# Patient Record
Sex: FEMALE | Race: WHITE | NOT HISPANIC OR LATINO | Employment: FULL TIME | URBAN - METROPOLITAN AREA
[De-identification: names, ages, dates, MRNs, and addresses within clinical notes are randomized per-mention and may not be internally consistent; named-entity substitution may affect disease eponyms.]

---

## 2017-01-06 DIAGNOSIS — N95.1 MENOPAUSAL STATE: ICD-10-CM

## 2017-01-06 DIAGNOSIS — F52.9 FEMALE SEXUAL DYSFUNCTION: ICD-10-CM

## 2017-01-09 RX ORDER — CONJUGATED ESTROGENS AND MEDROXYPROGESTERONE ACETATE .3; 1.5 MG/1; MG/1
TABLET, SUGAR COATED ORAL
Qty: 28 TABLET | Refills: 0 | OUTPATIENT
Start: 2017-01-09

## 2017-10-23 ENCOUNTER — HOSPITAL ENCOUNTER (OUTPATIENT)
Dept: PREADMISSION TESTING | Facility: HOSPITAL | Age: 57
Discharge: HOME | End: 2017-10-23
Attending: SURGERY
Payer: COMMERCIAL

## 2017-10-23 VITALS
SYSTOLIC BLOOD PRESSURE: 131 MMHG | OXYGEN SATURATION: 98 % | HEIGHT: 64 IN | RESPIRATION RATE: 16 BRPM | BODY MASS INDEX: 32.67 KG/M2 | TEMPERATURE: 98 F | DIASTOLIC BLOOD PRESSURE: 75 MMHG | WEIGHT: 191.38 LBS | HEART RATE: 75 BPM

## 2017-10-23 DIAGNOSIS — K80.20 GALLSTONES: ICD-10-CM

## 2017-10-23 LAB
ALBUMIN SERPL BCP-MCNC: 3.5 G/DL
ALP SERPL-CCNC: 115 U/L
ALT SERPL W/O P-5'-P-CCNC: 11 U/L
ANION GAP SERPL CALC-SCNC: 10 MMOL/L
AST SERPL-CCNC: 13 U/L
BASOPHILS # BLD AUTO: 0.02 K/UL
BASOPHILS NFR BLD: 0.2 %
BILIRUB SERPL-MCNC: 0.2 MG/DL
BUN SERPL-MCNC: 15 MG/DL
CALCIUM SERPL-MCNC: 9.5 MG/DL
CHLORIDE SERPL-SCNC: 102 MMOL/L
CO2 SERPL-SCNC: 26 MMOL/L
CREAT SERPL-MCNC: 1.1 MG/DL
DIFFERENTIAL METHOD: ABNORMAL
EOSINOPHIL # BLD AUTO: 0.1 K/UL
EOSINOPHIL NFR BLD: 1.4 %
ERYTHROCYTE [DISTWIDTH] IN BLOOD BY AUTOMATED COUNT: 13.4 %
EST. GFR  (AFRICAN AMERICAN): >60 ML/MIN/1.73 M^2
EST. GFR  (NON AFRICAN AMERICAN): 57 ML/MIN/1.73 M^2
GLUCOSE SERPL-MCNC: 93 MG/DL
HCT VFR BLD AUTO: 36.4 %
HGB BLD-MCNC: 12.7 G/DL
LYMPHOCYTES # BLD AUTO: 2.4 K/UL
LYMPHOCYTES NFR BLD: 24.8 %
MCH RBC QN AUTO: 29.2 PG
MCHC RBC AUTO-ENTMCNC: 34.9 G/DL
MCV RBC AUTO: 84 FL
MONOCYTES # BLD AUTO: 0.4 K/UL
MONOCYTES NFR BLD: 4.2 %
NEUTROPHILS # BLD AUTO: 6.6 K/UL
NEUTROPHILS NFR BLD: 69.4 %
PLATELET # BLD AUTO: 310 K/UL
PMV BLD AUTO: 8.9 FL
POTASSIUM SERPL-SCNC: 3.7 MMOL/L
PROT SERPL-MCNC: 7.7 G/DL
RBC # BLD AUTO: 4.35 M/UL
SODIUM SERPL-SCNC: 138 MMOL/L
WBC # BLD AUTO: 9.47 K/UL

## 2017-10-23 PROCEDURE — 80053 COMPREHEN METABOLIC PANEL: CPT

## 2017-10-23 PROCEDURE — 36415 COLL VENOUS BLD VENIPUNCTURE: CPT

## 2017-10-23 PROCEDURE — 85025 COMPLETE CBC W/AUTO DIFF WBC: CPT

## 2017-10-23 RX ORDER — IBUPROFEN 200 MG
400 TABLET ORAL EVERY 6 HOURS PRN
COMMUNITY
End: 2022-01-24

## 2017-10-23 RX ORDER — ERGOCALCIFEROL 1.25 MG/1
50000 CAPSULE ORAL
COMMUNITY

## 2017-10-23 RX ORDER — DILTIAZEM HYDROCHLORIDE 300 MG/1
300 CAPSULE, COATED, EXTENDED RELEASE ORAL DAILY
COMMUNITY

## 2017-10-23 RX ORDER — CHOLECALCIFEROL (VITAMIN D3) 25 MCG
1000 TABLET ORAL DAILY
COMMUNITY
End: 2020-08-21

## 2017-10-23 NOTE — DISCHARGE INSTRUCTIONS
Your surgery is scheduled for__FRIDAY  November  3,  2017_______________.    Call [phone] between 2 pm and 5 pm _THURSADAY November 2, 2017_______ to find out your arrival time for the day of surgery.      Report to SAME DAY SURGERY UNIT at _______AM   on the 2nd floor of the hospital.  Use the front entrance of the hospital before 6 am.                                              Important instructions:   Do not eat or drink after 12 midnight, including water.  It is okay to brush your teeth.  Do not have gum, candy or mints.    TAKE  CELEXA,  CARTIA,  AND  OMEPRAZOLE  WITH A SIP OF WATER THE AM OF SURGERY                 Prep instructions:    SHOWER   OTHER_____________      Please shower the night before and the morning of your surgery.       Use Hibiclens soap to your surgery site if instructed by your pre op nurse.   If your surgery is on your abdomen, be sure to wash your naval( BELLY BUTTON).    Be sure to rinse off Hibiclens after it is on your skin for several minutes.  Do not use Hibiclens on your face or genitals.     Do not wear make- up, including mascara.     You may wear deodorant only.     Do not wear powder, body lotion or cologne.     If you are going home on the same day of surgery, you must have arrangements for a ride home.  You will not be able to drive home if you were given anesthesia or sedation.       Stop taking Aspirin, Ibuprofen, Motrin and Aleve at least 3-5 days before your surgery. May take Tylenol / Acetaminophen  If needed     Wear loose fitting clothes allowing for bandages.     Please leave money and valuables home.        You may bring your cell phone.     Call the doctor if fever or illness should occur before your surgery.    Call [phone] to contact us here at [facility] if needed.

## 2017-11-02 ENCOUNTER — ANESTHESIA EVENT (OUTPATIENT)
Dept: SURGERY | Facility: HOSPITAL | Age: 57
End: 2017-11-02
Payer: COMMERCIAL

## 2017-11-03 ENCOUNTER — ANESTHESIA (OUTPATIENT)
Dept: SURGERY | Facility: HOSPITAL | Age: 57
End: 2017-11-03
Payer: COMMERCIAL

## 2017-11-03 ENCOUNTER — HOSPITAL ENCOUNTER (OUTPATIENT)
Facility: HOSPITAL | Age: 57
Discharge: HOME | End: 2017-11-03
Attending: SURGERY | Admitting: SURGERY
Payer: COMMERCIAL

## 2017-11-03 ENCOUNTER — SURGERY (OUTPATIENT)
Age: 57
End: 2017-11-03

## 2017-11-03 VITALS
DIASTOLIC BLOOD PRESSURE: 69 MMHG | WEIGHT: 191.38 LBS | SYSTOLIC BLOOD PRESSURE: 120 MMHG | OXYGEN SATURATION: 94 % | RESPIRATION RATE: 18 BRPM | BODY MASS INDEX: 32.67 KG/M2 | TEMPERATURE: 98 F | HEART RATE: 76 BPM | HEIGHT: 64 IN

## 2017-11-03 DIAGNOSIS — K80.20 GALLSTONES: Primary | ICD-10-CM

## 2017-11-03 PROCEDURE — 36000709 HC OR TIME LEV III EA ADD 15 MIN: Performed by: SURGERY

## 2017-11-03 PROCEDURE — 37000009 HC ANESTHESIA EA ADD 15 MINS: Performed by: SURGERY

## 2017-11-03 PROCEDURE — 36000708 HC OR TIME LEV III 1ST 15 MIN: Performed by: SURGERY

## 2017-11-03 PROCEDURE — 88304 TISSUE EXAM BY PATHOLOGIST: CPT | Performed by: PATHOLOGY

## 2017-11-03 PROCEDURE — 63600175 PHARM REV CODE 636 W HCPCS: Performed by: SURGERY

## 2017-11-03 PROCEDURE — 27201423 OPTIME MED/SURG SUP & DEVICES STERILE SUPPLY: Performed by: SURGERY

## 2017-11-03 PROCEDURE — C9290 INJ, BUPIVACAINE LIPOSOME: HCPCS | Performed by: SURGERY

## 2017-11-03 PROCEDURE — 71000016 HC POSTOP RECOV ADDL HR: Performed by: SURGERY

## 2017-11-03 PROCEDURE — 71000033 HC RECOVERY, INTIAL HOUR: Performed by: SURGERY

## 2017-11-03 PROCEDURE — D9220A PRA ANESTHESIA: Mod: CRNA,,, | Performed by: NURSE ANESTHETIST, CERTIFIED REGISTERED

## 2017-11-03 PROCEDURE — 25000003 PHARM REV CODE 250: Performed by: SURGERY

## 2017-11-03 PROCEDURE — 71000015 HC POSTOP RECOV 1ST HR: Performed by: SURGERY

## 2017-11-03 PROCEDURE — 25000003 PHARM REV CODE 250: Performed by: ANESTHESIOLOGY

## 2017-11-03 PROCEDURE — 37000008 HC ANESTHESIA 1ST 15 MINUTES: Performed by: SURGERY

## 2017-11-03 PROCEDURE — 63600175 PHARM REV CODE 636 W HCPCS: Performed by: ANESTHESIOLOGY

## 2017-11-03 PROCEDURE — 25000003 PHARM REV CODE 250: Performed by: NURSE ANESTHETIST, CERTIFIED REGISTERED

## 2017-11-03 PROCEDURE — 63600175 PHARM REV CODE 636 W HCPCS: Performed by: NURSE ANESTHETIST, CERTIFIED REGISTERED

## 2017-11-03 PROCEDURE — 71000039 HC RECOVERY, EACH ADD'L HOUR: Performed by: SURGERY

## 2017-11-03 PROCEDURE — 88304 TISSUE EXAM BY PATHOLOGIST: CPT | Mod: 26,,, | Performed by: PATHOLOGY

## 2017-11-03 PROCEDURE — D9220A PRA ANESTHESIA: Mod: ANES,,, | Performed by: ANESTHESIOLOGY

## 2017-11-03 RX ORDER — LIDOCAINE HCL/PF 100 MG/5ML
SYRINGE (ML) INTRAVENOUS
Status: DISCONTINUED | OUTPATIENT
Start: 2017-11-03 | End: 2017-11-03

## 2017-11-03 RX ORDER — HYDROMORPHONE HYDROCHLORIDE 2 MG/ML
0.2 INJECTION, SOLUTION INTRAMUSCULAR; INTRAVENOUS; SUBCUTANEOUS EVERY 5 MIN PRN
Status: DISCONTINUED | OUTPATIENT
Start: 2017-11-03 | End: 2017-11-03 | Stop reason: HOSPADM

## 2017-11-03 RX ORDER — CEFAZOLIN SODIUM 2 G/50ML
2 SOLUTION INTRAVENOUS
Status: COMPLETED | OUTPATIENT
Start: 2017-11-03 | End: 2017-11-03

## 2017-11-03 RX ORDER — PROPOFOL 10 MG/ML
VIAL (ML) INTRAVENOUS
Status: DISCONTINUED | OUTPATIENT
Start: 2017-11-03 | End: 2017-11-03

## 2017-11-03 RX ORDER — SODIUM CHLORIDE, SODIUM LACTATE, POTASSIUM CHLORIDE, CALCIUM CHLORIDE 600; 310; 30; 20 MG/100ML; MG/100ML; MG/100ML; MG/100ML
INJECTION, SOLUTION INTRAVENOUS CONTINUOUS
Status: DISCONTINUED | OUTPATIENT
Start: 2017-11-03 | End: 2017-11-03 | Stop reason: HOSPADM

## 2017-11-03 RX ORDER — SODIUM CHLORIDE 0.9 % (FLUSH) 0.9 %
3 SYRINGE (ML) INJECTION
Status: DISCONTINUED | OUTPATIENT
Start: 2017-11-03 | End: 2017-11-03 | Stop reason: HOSPADM

## 2017-11-03 RX ORDER — SUCCINYLCHOLINE CHLORIDE 20 MG/ML
INJECTION INTRAMUSCULAR; INTRAVENOUS
Status: DISCONTINUED | OUTPATIENT
Start: 2017-11-03 | End: 2017-11-03

## 2017-11-03 RX ORDER — GLYCOPYRROLATE 0.2 MG/ML
INJECTION INTRAMUSCULAR; INTRAVENOUS
Status: DISCONTINUED | OUTPATIENT
Start: 2017-11-03 | End: 2017-11-03

## 2017-11-03 RX ORDER — HYDROCODONE BITARTRATE AND ACETAMINOPHEN 5; 325 MG/1; MG/1
1 TABLET ORAL EVERY 4 HOURS PRN
Qty: 30 TABLET | Refills: 0 | Status: SHIPPED | OUTPATIENT
Start: 2017-11-03 | End: 2017-11-13

## 2017-11-03 RX ORDER — ONDANSETRON 2 MG/ML
INJECTION INTRAMUSCULAR; INTRAVENOUS
Status: DISCONTINUED | OUTPATIENT
Start: 2017-11-03 | End: 2017-11-03

## 2017-11-03 RX ORDER — HYDROCODONE BITARTRATE AND ACETAMINOPHEN 5; 325 MG/1; MG/1
1 TABLET ORAL EVERY 4 HOURS PRN
Status: DISCONTINUED | OUTPATIENT
Start: 2017-11-03 | End: 2017-11-03 | Stop reason: HOSPADM

## 2017-11-03 RX ORDER — SODIUM CHLORIDE 9 MG/ML
INJECTION, SOLUTION INTRAVENOUS CONTINUOUS
Status: DISCONTINUED | OUTPATIENT
Start: 2017-11-03 | End: 2017-11-03 | Stop reason: HOSPADM

## 2017-11-03 RX ORDER — MIDAZOLAM HYDROCHLORIDE 1 MG/ML
INJECTION, SOLUTION INTRAMUSCULAR; INTRAVENOUS
Status: DISCONTINUED | OUTPATIENT
Start: 2017-11-03 | End: 2017-11-03

## 2017-11-03 RX ORDER — FENTANYL CITRATE 50 UG/ML
INJECTION, SOLUTION INTRAMUSCULAR; INTRAVENOUS
Status: DISCONTINUED | OUTPATIENT
Start: 2017-11-03 | End: 2017-11-03

## 2017-11-03 RX ORDER — PHENYLEPHRINE HYDROCHLORIDE 10 MG/ML
INJECTION INTRAVENOUS
Status: DISCONTINUED | OUTPATIENT
Start: 2017-11-03 | End: 2017-11-03

## 2017-11-03 RX ORDER — METOCLOPRAMIDE HYDROCHLORIDE 5 MG/ML
INJECTION INTRAMUSCULAR; INTRAVENOUS
Status: DISCONTINUED | OUTPATIENT
Start: 2017-11-03 | End: 2017-11-03

## 2017-11-03 RX ORDER — NEOSTIGMINE METHYLSULFATE 1 MG/ML
INJECTION, SOLUTION INTRAVENOUS
Status: DISCONTINUED | OUTPATIENT
Start: 2017-11-03 | End: 2017-11-03

## 2017-11-03 RX ORDER — LIDOCAINE HYDROCHLORIDE 10 MG/ML
1 INJECTION, SOLUTION EPIDURAL; INFILTRATION; INTRACAUDAL; PERINEURAL ONCE
Status: DISCONTINUED | OUTPATIENT
Start: 2017-11-03 | End: 2017-11-03 | Stop reason: HOSPADM

## 2017-11-03 RX ORDER — ROCURONIUM BROMIDE 10 MG/ML
INJECTION, SOLUTION INTRAVENOUS
Status: DISCONTINUED | OUTPATIENT
Start: 2017-11-03 | End: 2017-11-03

## 2017-11-03 RX ORDER — BUPIVACAINE HYDROCHLORIDE 2.5 MG/ML
INJECTION, SOLUTION EPIDURAL; INFILTRATION; INTRACAUDAL
Status: DISCONTINUED | OUTPATIENT
Start: 2017-11-03 | End: 2017-11-03 | Stop reason: HOSPADM

## 2017-11-03 RX ADMIN — GLYCOPYRROLATE 0.6 MG: 0.2 INJECTION, SOLUTION INTRAMUSCULAR; INTRAVENOUS at 10:11

## 2017-11-03 RX ADMIN — ROCURONIUM BROMIDE 15 MG: 10 INJECTION, SOLUTION INTRAVENOUS at 09:11

## 2017-11-03 RX ADMIN — MIDAZOLAM HYDROCHLORIDE 2 MG: 1 INJECTION, SOLUTION INTRAMUSCULAR; INTRAVENOUS at 09:11

## 2017-11-03 RX ADMIN — HYDROMORPHONE HYDROCHLORIDE 0.2 MG: 2 INJECTION INTRAMUSCULAR; INTRAVENOUS; SUBCUTANEOUS at 11:11

## 2017-11-03 RX ADMIN — CEFAZOLIN SODIUM 2 G: 2 SOLUTION INTRAVENOUS at 09:11

## 2017-11-03 RX ADMIN — BUPIVACAINE 20 ML: 13.3 INJECTION, SUSPENSION, LIPOSOMAL INFILTRATION at 10:11

## 2017-11-03 RX ADMIN — METOCLOPRAMIDE 10 MG: 5 INJECTION, SOLUTION INTRAMUSCULAR; INTRAVENOUS at 09:11

## 2017-11-03 RX ADMIN — SODIUM CHLORIDE, SODIUM LACTATE, POTASSIUM CHLORIDE, AND CALCIUM CHLORIDE: .6; .31; .03; .02 INJECTION, SOLUTION INTRAVENOUS at 07:11

## 2017-11-03 RX ADMIN — HYDROCODONE BITARTRATE AND ACETAMINOPHEN 1 TABLET: 5; 325 TABLET ORAL at 01:11

## 2017-11-03 RX ADMIN — LIDOCAINE HYDROCHLORIDE 100 MG: 20 INJECTION, SOLUTION INTRAVENOUS at 09:11

## 2017-11-03 RX ADMIN — NEOSTIGMINE METHYLSULFATE 5 MG: 1 INJECTION INTRAVENOUS at 10:11

## 2017-11-03 RX ADMIN — SUCCINYLCHOLINE CHLORIDE 120 MG: 20 INJECTION, SOLUTION INTRAMUSCULAR; INTRAVENOUS at 09:11

## 2017-11-03 RX ADMIN — GLYCOPYRROLATE 0.2 MG: 0.2 INJECTION, SOLUTION INTRAMUSCULAR; INTRAVENOUS at 09:11

## 2017-11-03 RX ADMIN — BUPIVACAINE HYDROCHLORIDE 20 ML: 2.5 INJECTION, SOLUTION EPIDURAL; INFILTRATION; INTRACAUDAL; PERINEURAL at 10:11

## 2017-11-03 RX ADMIN — PROPOFOL 200 MG: 10 INJECTION, EMULSION INTRAVENOUS at 09:11

## 2017-11-03 RX ADMIN — ROCURONIUM BROMIDE 5 MG: 10 INJECTION, SOLUTION INTRAVENOUS at 09:11

## 2017-11-03 RX ADMIN — PHENYLEPHRINE HYDROCHLORIDE 200 MCG: 10 INJECTION INTRAVENOUS at 10:11

## 2017-11-03 RX ADMIN — ONDANSETRON 4 MG: 2 INJECTION, SOLUTION INTRAMUSCULAR; INTRAVENOUS at 09:11

## 2017-11-03 RX ADMIN — FENTANYL CITRATE 100 MCG: 50 INJECTION INTRAMUSCULAR; INTRAVENOUS at 09:11

## 2017-11-03 NOTE — BRIEF OP NOTE
[facility]  Brief Operative Note     SUMMARY     Surgery Date: 11/3/2017     Surgeon(s) and Role:     * Mango Dc MD - Primary    Assisting Surgeon: None    Pre-op Diagnosis:  Calculus of gallbladder without cholecystitis without obstruction [K80.20]    Post-op Diagnosis:  Post-Op Diagnosis Codes:     * Calculus of gallbladder without cholecystitis without obstruction [K80.20]    Procedure(s) (LRB):  CHOLECYSTECTOMY-LAPAROSCOPIC (N/A)    Anesthesia: General    Description of the findings of the procedure: gallstones    Findings/Key Components: same    Estimated Blood Loss: minimal         Specimens:   Specimen (12h ago through future)    Start     Ordered    11/03/17 0958  Specimen to Pathology - Surgery  Once     Comments:  Gallbladder      11/03/17 0958          Discharge Note    SUMMARY     Admit Date: 11/3/2017    Discharge Date and Time:  11/03/2017 10:21 AM    Hospital Course (synopsis of major diagnoses, care, treatment, and services provided during the course of the hospital stay): uneventful post op course     Final Diagnosis: Post-Op Diagnosis Codes:     * Calculus of gallbladder without cholecystitis without obstruction [K80.20]    Disposition: Home or Self Care    Follow Up/Patient Instructions:     Medications:  Reconciled Home Medications:   Current Discharge Medication List      START taking these medications    Details   hydrocodone-acetaminophen 5-325mg (NORCO) 5-325 mg per tablet Take 1 tablet by mouth every 4 (four) hours as needed for Pain.  Qty: 30 tablet, Refills: 0         CONTINUE these medications which have NOT CHANGED    Details   citalopram (CELEXA) 40 MG tablet Take 40 mg by mouth once daily.       diltiaZEM (CARDIZEM CD) 300 MG 24 hr capsule Take 300 mg by mouth once daily.      ergocalciferol (VITAMIN D2) 50,000 unit Cap Take 50,000 Units by mouth every 7 days.      dicyclomine (BENTYL) 20 mg tablet Take 20 mg by mouth every 6 (six) hours as needed.        ibuprofen (ADVIL,MOTRIN) 200 MG tablet Take 400 mg by mouth every 6 (six) hours as needed for Pain.      omeprazole (PRILOSEC) 20 MG capsule Take 2 capsules (40 mg total) by mouth once daily.  Qty: 30 capsule, Refills: 0      vitamin D 1000 units Tab Take 1,000 Units by mouth once daily.             Discharge Procedure Orders  Diet general       Follow-up Information     Mango Dc MD In 1 week.    Specialty:  General Surgery  Contact information:  [address] [place] [address]  [id number]  [place] [address] [phone]

## 2017-11-03 NOTE — TRANSFER OF CARE
"Anesthesia Transfer of Care Note    Patient: Dior Godwin    Procedure(s) Performed: Procedure(s) (LRB):  CHOLECYSTECTOMY-LAPAROSCOPIC (N/A)    Patient location: PACU    Anesthesia Type: general    Transport from OR: Transported from OR on room air with adequate spontaneous ventilation    Post pain: adequate analgesia    Post assessment: no apparent anesthetic complications    Post vital signs: stable    Level of consciousness: awake and sedated    Nausea/Vomiting: no nausea/vomiting    Complications: none    Transfer of care protocol was followed      Last vitals:   Visit Vitals  /85 (BP Location: Right arm, Patient Position: Lying)   Pulse 75   Temp 36.9 °C (98.4 °F) (Oral)   Resp 17   Ht 5' 4" (1.626 m)   Wt 86.8 kg (191 lb 6 oz)   SpO2 95%   Breastfeeding? No   BMI 32.85 kg/m²     "

## 2017-11-03 NOTE — OP NOTE
Laparoscopic cholecystectomy      DATE OF PROCEDURE: 11/03/2017       PREOPERATIVE DIAGNOSIS: Symptomatic cholelithiasis.     POSTOPERATIVE DIAGNOSIS: Symptomatic cholelithiasis.     PROCEDURE PERFORMED: Laparoscopic cholecystectomy.     SURGEON: Mango Dc M.D.     ANESTHESIA: General.     ESTIMATED BLOOD LOSS: minimal     DESCRIPTION OF OPERATION: The patient was brought to the Operating Room, placed  on operating room table in supine position. Under adequate anesthesia, prepped  and draped around her abdomen in the usual sterile fashion. Incision was made   in umbilicus through which a 5-mm port was inserted under direct vision. The   patient had her abdomen insufflated with 3.5 liters of CO2. Two other ports   were placed. An epigastric 11 mm and off right subcostal 5 mm port were placed.  The gallbladder was identified and retracted in cephalad fashion. Dissection   was done around the triangle of Calot. The cystic duct and cystic artery were   both identified and divided. The cystic duct was clipped. The gallbladder was   removed from the gallbladder fossa in antegrade fashion and brought out through   the epigastric port site. The abdomen was desufflated. The ports were removed   and port sites closed in layers with absorbable suture. Steri-Strips were   applied as well as bandage. The patient was awakened and transported to the   Recovery Room in satisfactory condition.

## 2017-11-03 NOTE — ANESTHESIA POSTPROCEDURE EVALUATION
"Anesthesia Post Evaluation    Patient: Dior Godwin    Procedure(s) Performed: Procedure(s) (LRB):  CHOLECYSTECTOMY-LAPAROSCOPIC (N/A)    Final Anesthesia Type: general  Patient location during evaluation: PACU  Patient participation: Yes- Able to Participate  Level of consciousness: awake and alert, oriented and awake  Post-procedure vital signs: reviewed and stable  Airway patency: patent  PONV status at discharge: No PONV  Anesthetic complications: no      Cardiovascular status: blood pressure returned to baseline  Respiratory status: unassisted, spontaneous ventilation and room air  Hydration status: euvolemic  Follow-up not needed.        Visit Vitals  /66   Pulse 72   Temp 36.9 °C (98.4 °F) (Oral)   Resp 18   Ht 5' 4" (1.626 m)   Wt 86.8 kg (191 lb 6 oz)   SpO2 96%   Breastfeeding? No   BMI 32.85 kg/m²       Pain/Richy Score: Pain Assessment Performed: Yes (11/3/2017 10:40 AM)  Presence of Pain: complains of pain/discomfort (11/3/2017 11:14 AM)  Pain Rating Prior to Med Admin: 4 (11/3/2017 11:26 AM)  Richy Score: 8 (11/3/2017 11:14 AM)      "

## 2017-11-03 NOTE — ANESTHESIA PREPROCEDURE EVALUATION
11/03/2017  Dior Godwin is a 57 y.o., female.    Anesthesia Evaluation    I have reviewed the Patient Summary Reports.    I have reviewed the Nursing Notes.   I have reviewed the Medications.     Review of Systems  Anesthesia Hx:  No problems with previous Anesthesia Denies Hx of Anesthetic complications  Neg history of prior surgery. Denies Family Hx of Anesthesia complications.   Denies Personal Hx of Anesthesia complications.   Social:  Non-Smoker, No Alcohol Use    Hematology/Oncology:  Hematology Normal   Oncology Normal     EENT/Dental:EENT/Dental Normal   Cardiovascular:   Exercise tolerance: good Hypertension    Pulmonary:  Pulmonary Normal    Renal/:  Renal/ Normal     Hepatic/GI:  Hepatic/GI Normal    Musculoskeletal:  Musculoskeletal Normal    Neurological:  Neurology Normal    Endocrine:  Endocrine Normal    Dermatological:  Skin Normal    Psych:  Psychiatric Normal           Physical Exam  General:  Well nourished    Airway/Jaw/Neck:  Airway Findings: Mouth Opening: Normal General Airway Assessment: Adult  Mallampati: II  TM Distance: Normal, at least 6 cm         Dental:  DENTAL FINDINGS: Normal   Chest/Lungs:  Chest/Lungs Clear    Heart/Vascular:  Heart Findings: Normal Heart murmur: negative       Mental Status:  Mental Status Findings:  Cooperative, Alert and Oriented         Anesthesia Plan  Type of Anesthesia, risks & benefits discussed:  Anesthesia Type:  general  Patient's Preference:   Intra-op Monitoring Plan:   Intra-op Monitoring Plan Comments:   Post Op Pain Control Plan:   Post Op Pain Control Plan Comments:   Induction:   IV  Beta Blocker:  Patient is not currently on a Beta-Blocker (No further documentation required).       Informed Consent: Patient understands risks and agrees with Anesthesia plan.  Questions answered. Anesthesia consent signed with patient.  ASA Score:  2     Day of Surgery Review of History & Physical:            Ready For Surgery From Anesthesia Perspective.

## 2017-11-03 NOTE — H&P (VIEW-ONLY)
Subjective:       Patient ID: Dior Godwin is a 57 y.o. female.    Chief Complaint: Cholelithiasis    HPI 58 yo female with symptomatic cholelithiasis  Review of Systems   Cardiovascular: Positive for chest pain.   Gastrointestinal: Positive for abdominal pain and nausea.       Objective:      Physical Exam   Constitutional: She is oriented to person, place, and time. She appears well-developed and well-nourished.   HENT:   Head: Normocephalic and atraumatic.   Right Ear: External ear normal.   Left Ear: External ear normal.   Nose: Nose normal.   Mouth/Throat: Oropharynx is clear and moist.   Eyes: Conjunctivae and EOM are normal. Pupils are equal, round, and reactive to light.   Neck: Normal range of motion. Neck supple.   Cardiovascular: Normal rate, regular rhythm, normal heart sounds and intact distal pulses.    Pulmonary/Chest: Effort normal and breath sounds normal.   Abdominal: Soft. Bowel sounds are normal.   Musculoskeletal: Normal range of motion.   Neurological: She is alert and oriented to person, place, and time. She has normal reflexes.   Skin: Skin is warm and dry.   Psychiatric: She has a normal mood and affect. Her behavior is normal. Thought content normal.   Vitals reviewed.      Assessment:       1. Gallstones        Plan:       To the OR for lap guillermo and the risks and complications were explained and she agrees to proceed

## 2017-12-13 ENCOUNTER — PATIENT MESSAGE (OUTPATIENT)
Dept: CASE MANAGEMENT | Facility: HOSPITAL | Age: 57
End: 2017-12-13

## 2019-06-11 ENCOUNTER — OFFICE VISIT (OUTPATIENT)
Dept: OBSTETRICS AND GYNECOLOGY | Facility: CLINIC | Age: 59
End: 2019-06-11
Payer: COMMERCIAL

## 2019-06-11 VITALS
BODY MASS INDEX: 28.97 KG/M2 | WEIGHT: 168.75 LBS | SYSTOLIC BLOOD PRESSURE: 142 MMHG | DIASTOLIC BLOOD PRESSURE: 74 MMHG

## 2019-06-11 DIAGNOSIS — Z00.00 ANNUAL PHYSICAL EXAM: ICD-10-CM

## 2019-06-11 DIAGNOSIS — N93.9 ABNORMAL UTERINE BLEEDING: ICD-10-CM

## 2019-06-11 DIAGNOSIS — N72 CERVICITIS AND ENDOCERVICITIS: ICD-10-CM

## 2019-06-11 DIAGNOSIS — N76.0 ACUTE VAGINITIS: Primary | ICD-10-CM

## 2019-06-11 DIAGNOSIS — N85.2 ENLARGED UTERUS: ICD-10-CM

## 2019-06-11 DIAGNOSIS — N93.0 PCB (POST COITAL BLEEDING): ICD-10-CM

## 2019-06-11 DIAGNOSIS — N95.1 MENOPAUSAL STATE: ICD-10-CM

## 2019-06-11 PROCEDURE — 99386 PR PREVENTIVE VISIT,NEW,40-64: ICD-10-PCS | Mod: S$GLB,,, | Performed by: OBSTETRICS & GYNECOLOGY

## 2019-06-11 PROCEDURE — 99999 PR PBB SHADOW E&M-EST. PATIENT-LVL IV: CPT | Mod: PBBFAC,,, | Performed by: OBSTETRICS & GYNECOLOGY

## 2019-06-11 PROCEDURE — 87510 GARDNER VAG DNA DIR PROBE: CPT

## 2019-06-11 PROCEDURE — 99999 PR PBB SHADOW E&M-EST. PATIENT-LVL IV: ICD-10-PCS | Mod: PBBFAC,,, | Performed by: OBSTETRICS & GYNECOLOGY

## 2019-06-11 PROCEDURE — 99386 PREV VISIT NEW AGE 40-64: CPT | Mod: S$GLB,,, | Performed by: OBSTETRICS & GYNECOLOGY

## 2019-06-11 PROCEDURE — 88175 CYTOPATH C/V AUTO FLUID REDO: CPT

## 2019-06-11 PROCEDURE — 87480 CANDIDA DNA DIR PROBE: CPT

## 2019-06-11 PROCEDURE — 3008F PR BODY MASS INDEX (BMI) DOCUMENTED: ICD-10-PCS | Mod: CPTII,S$GLB,, | Performed by: OBSTETRICS & GYNECOLOGY

## 2019-06-11 PROCEDURE — 3008F BODY MASS INDEX DOCD: CPT | Mod: CPTII,S$GLB,, | Performed by: OBSTETRICS & GYNECOLOGY

## 2019-06-11 NOTE — PATIENT INSTRUCTIONS

## 2019-06-11 NOTE — PROGRESS NOTES
Subjective:      Chief Complaint:    Chief Complaint   Patient presents with    Painful [place]     spotting afterwards       Menstrual History:    OB History        1    Para        Term                AB        Living   1       SAB        TAB        Ectopic        Multiple        Live Births                     Menarche age: 15     No LMP recorded. Patient is postmenopausal.                Objective:      PRESENT ILLNESS, PHYSICAL EXAMINATION NOTE    The patient is 59 years of age, here for annual exam because of painful   intercourse and some bleeding after sex.  Mammogram 2016 negative.  Pap smear   2015 negative.  Bone density, osteopenia.    PAST MEDICAL HISTORY:  Arthritis, hypertension.    SURGERIES:  Cervical polypectomy, cholecystectomy and finger surgery.    The patient discontinued Prempro.  The patient has noted bleeding after sexual   activity, also painful sex.    PHYSICAL EXAMINATION:  VITAL SIGNS:  Blood pressure 142/74, weight 168.  BREASTS:  No lumps, mass, discharge, skin changes, retraction, nipple changes.    Axilla negative.  PELVIC:  External normal.  Vulva normal.  Bartholin, urethral and Haywood glands   are negative.  Vagina, whitish creamy discharge.  Cervix, no polyp; however, the   patient's cervix is friable, bleeding noted after Pap smear.  Uterus is   slightly irregular.  Adnexa enlarged on the right side.  The left is negative.    No pain elicited on examination.  RECTAL:  Negative.    IMPRESSION:  Post-coital bleeding friable cervix.    PLAN:  Pap smear, mammogram, bone density, also pelvic ultrasound and vaginal   culture.  We will decide on treatment after we receive the report.  The patient   to continue with calcium, vitamin D, multivitamins and we will see her back   within a week to 10 days and decide on followup and treatment.      JONATHAN  dd: 2019 15:45:31 (CDT)  td: 2019 03:22:54 (CDT)  Doc ID   #[id number]  Job ID #[id number]    CC:     History of  Present Illness AND  Examination detailed DICTATE:        Physical Exam   Constitutional: She is oriented to person, place, and time. She appears well-developed and well-nourished. No distress.   HENT:   Head: Normocepha  Eyes: Pupils are equal, round, and reactive to light.   Neck: Neck supLE   Cardiovascular: Normal rate, regular rhythm and normal heart sounds. No murmur heard.  Pulmonary/Chest: Effort normal and breath sounds normal. No respiratory distress. She has no wheezes. She has no rales. She exhibits no tenderness.   Abdominal: Bowel sounds are normal. She exhibits no distension and no mass. There is no tenderness. There is no rebound and no guarding.   Musculoskeletal: Normal range of motion.   Lymphadenopathy:        Right: No inguinal adenopathy present.        Left: No inguinal adenopathy present.   Neurological: She is alert and oriented.  Skin: Skin is warm. No rash noted.        Review of Systems  Review of Systems   Normal ROS:   Constitutional: Negative for fever, chills, activity change fatigue and unexpected weight change.   HENT: Negative for nosebleeds, congestion.  Eyes: Negative for visual disturbance.   Respiratory: Negative for shortness of breath and wheezing.    Cardiovascular: Negative for chest pain, palpitations.   Gastrointestinal: Negative for abdominal pain, diarrhea, constipation, blood in stool and abdominal distention.   Musculoskeletal: Negative for back pain.   Allergic/Immunologic: Negative   Neurological: Negative    Hematological: Negative f.   Psychiatric/Behavioral: Negative .    Assessment:      Diagnosis:POST COITAL   BLEEDING   MENOPAUSAL    VAG   ATROPHY        Plan:      Return in 2  weeks

## 2019-06-12 LAB
BACTERIAL VAGINOSIS DNA: POSITIVE
CANDIDA GLABRATA DNA: NEGATIVE
CANDIDA KRUSEI DNA: NEGATIVE
CANDIDA RRNA VAG QL PROBE: NEGATIVE
T VAGINALIS RRNA GENITAL QL PROBE: NEGATIVE

## 2019-06-13 ENCOUNTER — TELEPHONE (OUTPATIENT)
Dept: OBSTETRICS AND GYNECOLOGY | Facility: CLINIC | Age: 59
End: 2019-06-13

## 2019-06-13 DIAGNOSIS — N76.1 CHRONIC VAGINITIS: Primary | ICD-10-CM

## 2019-06-13 RX ORDER — METRONIDAZOLE 500 MG/1
500 TABLET ORAL EVERY 12 HOURS
Qty: 14 TABLET | Refills: 0 | Status: SHIPPED | OUTPATIENT
Start: 2019-06-13 | End: 2019-06-27

## 2019-06-13 NOTE — TELEPHONE ENCOUNTER
----- Message from Leo Nuñez MD sent at 6/13/2019  8:01 AM CDT -----  Medication ordered, please   prescription at the pharmacy you have listed on file in our system .

## 2019-06-17 ENCOUNTER — TELEPHONE (OUTPATIENT)
Dept: OBSTETRICS AND GYNECOLOGY | Facility: CLINIC | Age: 59
End: 2019-06-17

## 2019-06-17 NOTE — TELEPHONE ENCOUNTER
----- Message from Gita Carpio sent at 6/17/2019  1:44 PM CDT -----  Contact: [facility]   Pt would like her orders for scans to [facility]. Phone # to [facility] [phone]

## 2019-06-18 DIAGNOSIS — R10.2 PELVIC PAIN IN FEMALE: Primary | ICD-10-CM

## 2019-06-20 ENCOUNTER — TELEPHONE (OUTPATIENT)
Dept: OBSTETRICS AND GYNECOLOGY | Facility: CLINIC | Age: 59
End: 2019-06-20

## 2019-06-20 ENCOUNTER — OFFICE VISIT (OUTPATIENT)
Dept: OBSTETRICS AND GYNECOLOGY | Facility: CLINIC | Age: 59
End: 2019-06-20
Payer: COMMERCIAL

## 2019-06-20 VITALS
BODY MASS INDEX: 29.14 KG/M2 | SYSTOLIC BLOOD PRESSURE: 146 MMHG | WEIGHT: 169.75 LBS | DIASTOLIC BLOOD PRESSURE: 64 MMHG

## 2019-06-20 DIAGNOSIS — D25.9 UTERINE LEIOMYOMA, UNSPECIFIED LOCATION: ICD-10-CM

## 2019-06-20 DIAGNOSIS — N72 CERVICITIS AND ENDOCERVICITIS: ICD-10-CM

## 2019-06-20 DIAGNOSIS — N95.2 ATROPHIC VAGINITIS: ICD-10-CM

## 2019-06-20 DIAGNOSIS — N95.1 MENOPAUSAL STATE: Primary | ICD-10-CM

## 2019-06-20 PROCEDURE — 99213 PR OFFICE/OUTPT VISIT, EST, LEVL III, 20-29 MIN: ICD-10-PCS | Mod: S$GLB,,, | Performed by: OBSTETRICS & GYNECOLOGY

## 2019-06-20 PROCEDURE — 3008F PR BODY MASS INDEX (BMI) DOCUMENTED: ICD-10-PCS | Mod: CPTII,S$GLB,, | Performed by: OBSTETRICS & GYNECOLOGY

## 2019-06-20 PROCEDURE — 99999 PR PBB SHADOW E&M-EST. PATIENT-LVL III: CPT | Mod: PBBFAC,,, | Performed by: OBSTETRICS & GYNECOLOGY

## 2019-06-20 PROCEDURE — 99213 OFFICE O/P EST LOW 20 MIN: CPT | Mod: S$GLB,,, | Performed by: OBSTETRICS & GYNECOLOGY

## 2019-06-20 PROCEDURE — 99999 PR PBB SHADOW E&M-EST. PATIENT-LVL III: ICD-10-PCS | Mod: PBBFAC,,, | Performed by: OBSTETRICS & GYNECOLOGY

## 2019-06-20 PROCEDURE — 3008F BODY MASS INDEX DOCD: CPT | Mod: CPTII,S$GLB,, | Performed by: OBSTETRICS & GYNECOLOGY

## 2019-06-20 NOTE — PROGRESS NOTES
Subjective:      Chief Complaint:    Chief Complaint   Patient presents with    Results       Menstrual History:    OB History        1    Para        Term                AB        Living   1       SAB        TAB        Ectopic        Multiple        Live Births                     Menarche age: 15     No LMP recorded. Patient is postmenopausal.            Objective:    The patient is 59 years of age.   1, para 1, menopausal was in the office   approximately a couple of weeks ago for routine exam because of dyspareunia and   bleeding, postcoital bleeding.    On examination, I find a slightly enlarged uterus, friable and inflamed, cervix   and vaginal atrophy, since the patient is not on any estrogen replacement   therapy.  Pap smear was negative.  Ultrasound indicated a small posterior   fibroid.  Mammogram, bone density normal.  Discussed the finding with the   patient and also the patient had bacterial vaginosis treated with metronidazole.    I feel that her bleeding has come from the inflamed friable cervix.  I do not   think that the fibroid is causing any problem.  Discussed with the patient the   cause of the painful activity.  Because of atrophic vagina and dryness.    Discussed hormone replacement therapy, pros, cons, benefit and side effects.    Also, discussed with the patient by putting her on estrogen, we possibly can   increase the growth rate of the fibroid.  We will start her on Prempro 3 times   1.5 daily.  We will see her back in three months and reevaluate the situation.    Also, we will follow up on her cervicitis.  If it is still present, we may   consider cryosurgery for the cervix.  As stated, Pap smear is negative.      JONATHAN  dd: 2019 13:36:07 (CDT)  td: 2019 22:09:06 (CDT)  Doc ID   #[id number]  Job ID #[id number]    CC:       History of Present Illness AND  Examination detailed DICTATE:             Assessment:      Diagnosis: MENOPAUSAL   FIBROID    CERVICITIS    VAG   ATROPHY       Plan:      Return in 3  months

## 2019-06-20 NOTE — TELEPHONE ENCOUNTER
----- Message from Elida Acuna sent at 6/20/2019 11:03 AM CDT -----  Contact: self  Type: Patient Call Back    Who called: self    What is the request in detail: patient is wanting know if the results for all test were in before her appt. today    Can the clinic reply by [facility]? call    Would the patient rather a call back or a response via [facility]? call    Best call back number: [phone]

## 2019-09-16 PROBLEM — Z00.00 ANNUAL PHYSICAL EXAM: Status: RESOLVED | Noted: 2019-06-11 | Resolved: 2019-09-16

## 2020-04-23 ENCOUNTER — OFFICE VISIT (OUTPATIENT)
Dept: OBSTETRICS AND GYNECOLOGY | Facility: CLINIC | Age: 60
End: 2020-04-23
Payer: COMMERCIAL

## 2020-04-23 DIAGNOSIS — N76.0 BV (BACTERIAL VAGINOSIS): Primary | ICD-10-CM

## 2020-04-23 DIAGNOSIS — B96.89 BV (BACTERIAL VAGINOSIS): Primary | ICD-10-CM

## 2020-04-23 PROCEDURE — 99213 OFFICE O/P EST LOW 20 MIN: CPT | Mod: 95,,, | Performed by: OBSTETRICS & GYNECOLOGY

## 2020-04-23 PROCEDURE — 99213 PR OFFICE/OUTPT VISIT, EST, LEVL III, 20-29 MIN: ICD-10-PCS | Mod: 95,,, | Performed by: OBSTETRICS & GYNECOLOGY

## 2020-04-23 RX ORDER — METRONIDAZOLE 500 MG/1
500 TABLET ORAL EVERY 12 HOURS
Qty: 28 TABLET | Refills: 0 | Status: SHIPPED | OUTPATIENT
Start: 2020-04-23 | End: 2020-04-30

## 2020-04-23 NOTE — PROGRESS NOTES
The patient location is: home    The chief complaint leading to consultation is: vaginal discharge    Visit type: Virtual visit with synchronous audio     Total time spent with patient: 15 mins    Each patient to whom he or she provides medical services by telemedicine is:  (1) informed of the relationship between the physician and patient and the respective role of any other health care provider with respect to management of the patient; and (2) notified that he or she may decline to receive medical services by telemedicine and may withdraw from such care at any time.    Notes:     [facility]  Telemedicine Visit  Obstetrics & Gynecology    Visit Date:  4/23/2020    Chief Complaint:  Vaginal discharge    History of Present Illness:      Dior Godwin is a 60 y.o., new pt to me, here with c/o vaginal discharge.    Pt described discharge as clear, fishy, non bloody, without pelvic pain or abnormal vaginal bleeding for past few days.      Pt also reports she was tested positive for COVID 19, reports no sxs, denies SOB, CP, fever, cough, or GI changes.  Pt reports she is doing well at home and has self quarantine.      Pt denies vaginal bleeding, vaginal discharge, dyspareunia, pelvic pain, bloating, early satiety, unintentional weight loss, breast mass/skin changes, incontinence, GI or urinary complaints.      Otherwise, the pt is in her usual state of health.    Review of Systems:      GENERAL:  No fever, fatigue, excessive weight gain or loss  HEENT:  No headaches, hearing changes, visual disturbance  RESPIRATORY:  No cough, shortness of breath  CARDIOVASCULAR:  No chest pain, heart palpitations, leg swelling  BREAST:  No lump, pain, nipple discharge, skin changes  GASTROINTESTINAL:  No nausea, vomiting, constipation, diarrhea, abd pain, rectal bleeding   GENITOURINARY:  See HPI    Physical Exam:     Deferred, telemedicine visit  Pt appears alert and oriented with no acute  distress  Mood/affect appropriate     Assessment:     60 y.o.:    1. Bacterial vaginosis    Plan:    Flagyl 500 mg po bid x 7 days for bacterial vaginosis.  No alcohol while on antibx.  Hygiene advice.    Limitations of today's telemedicine visit discussed.  Pt advised to see us in clinic if her symptoms does not improve or any concerns.    Encourage healthy lifestyle modifications.    F/u with PCP for health maintenance.    Discussed with pt our local health care system response to Covid-19.  Discussed preventive measures, social distancing, and parameters for testing.  Clinic and hospital mitigation policies and preventative strategies discussed.  Sxs to report to ED discussed.    Return 3 months for gynecologic exam, or sooner as needed, pt advised to call and schedule.  All questions answered, pt voiced understanding.        Toby Hidns MD

## 2020-08-21 ENCOUNTER — OFFICE VISIT (OUTPATIENT)
Dept: OBSTETRICS AND GYNECOLOGY | Facility: CLINIC | Age: 60
End: 2020-08-21
Attending: STUDENT IN AN ORGANIZED HEALTH CARE EDUCATION/TRAINING PROGRAM
Payer: COMMERCIAL

## 2020-08-21 ENCOUNTER — LAB VISIT (OUTPATIENT)
Dept: LAB | Facility: HOSPITAL | Age: 60
End: 2020-08-21
Attending: STUDENT IN AN ORGANIZED HEALTH CARE EDUCATION/TRAINING PROGRAM
Payer: COMMERCIAL

## 2020-08-21 VITALS
HEIGHT: 64 IN | WEIGHT: 166.88 LBS | BODY MASS INDEX: 28.49 KG/M2 | SYSTOLIC BLOOD PRESSURE: 124 MMHG | DIASTOLIC BLOOD PRESSURE: 78 MMHG

## 2020-08-21 DIAGNOSIS — N95.1 MENOPAUSAL HOT FLUSHES: ICD-10-CM

## 2020-08-21 DIAGNOSIS — Z12.31 VISIT FOR SCREENING MAMMOGRAM: Primary | ICD-10-CM

## 2020-08-21 DIAGNOSIS — Z32.02 NEGATIVE PREGNANCY TEST: ICD-10-CM

## 2020-08-21 DIAGNOSIS — Z01.419 WELL WOMAN EXAM: ICD-10-CM

## 2020-08-21 DIAGNOSIS — N89.8 VAGINAL DISCHARGE: ICD-10-CM

## 2020-08-21 DIAGNOSIS — Z12.4 ENCOUNTER FOR SCREENING FOR CERVICAL CANCER: ICD-10-CM

## 2020-08-21 DIAGNOSIS — N93.0 POSTCOITAL BLEEDING: ICD-10-CM

## 2020-08-21 LAB
BACTERIA #/AREA URNS AUTO: NORMAL /HPF
BACTERIA HYPHAE, POC: POSITIVE
BILIRUB UR QL STRIP: NEGATIVE
CLARITY UR REFRACT.AUTO: CLEAR
COLOR UR AUTO: YELLOW
ESTRADIOL SERPL-MCNC: 10 PG/ML
FSH SERPL-ACNC: 133.8 MIU/ML
GARDNERELLA VAGINALIS: NEGATIVE
GLUCOSE UR QL STRIP: NEGATIVE
HGB UR QL STRIP: ABNORMAL
KETONES UR QL STRIP: NEGATIVE
LEUKOCYTE ESTERASE UR QL STRIP: ABNORMAL
MICROSCOPIC COMMENT: NORMAL
NITRITE UR QL STRIP: NEGATIVE
OTHER MICROSC. OBSERVATIONS: ABNORMAL
PH UR STRIP: 5 [PH] (ref 5–8)
POC BACTERIAL VAGINOSIS: POSITIVE
POC CLUE CELLS: NEGATIVE
PROT UR QL STRIP: NEGATIVE
RBC #/AREA URNS AUTO: 1 /HPF (ref 0–4)
SP GR UR STRIP: 1.02 (ref 1–1.03)
SQUAMOUS #/AREA URNS AUTO: 1 /HPF
TRICHOMONAS, POC: NEGATIVE
TSH SERPL DL<=0.005 MIU/L-ACNC: 1.16 UIU/ML (ref 0.4–4)
URN SPEC COLLECT METH UR: ABNORMAL
WBC #/AREA URNS AUTO: 1 /HPF (ref 0–5)
YEAST WET PREP: NEGATIVE
YEAST, POC: NEGATIVE

## 2020-08-21 PROCEDURE — 99396 PREV VISIT EST AGE 40-64: CPT | Mod: S$GLB,,, | Performed by: STUDENT IN AN ORGANIZED HEALTH CARE EDUCATION/TRAINING PROGRAM

## 2020-08-21 PROCEDURE — 3008F BODY MASS INDEX DOCD: CPT | Mod: CPTII,S$GLB,, | Performed by: STUDENT IN AN ORGANIZED HEALTH CARE EDUCATION/TRAINING PROGRAM

## 2020-08-21 PROCEDURE — 99999 PR PBB SHADOW E&M-EST. PATIENT-LVL IV: ICD-10-PCS | Mod: PBBFAC,,, | Performed by: STUDENT IN AN ORGANIZED HEALTH CARE EDUCATION/TRAINING PROGRAM

## 2020-08-21 PROCEDURE — 83001 ASSAY OF GONADOTROPIN (FSH): CPT

## 2020-08-21 PROCEDURE — 99396 PR PREVENTIVE VISIT,EST,40-64: ICD-10-PCS | Mod: S$GLB,,, | Performed by: STUDENT IN AN ORGANIZED HEALTH CARE EDUCATION/TRAINING PROGRAM

## 2020-08-21 PROCEDURE — 82670 ASSAY OF TOTAL ESTRADIOL: CPT

## 2020-08-21 PROCEDURE — 87086 URINE CULTURE/COLONY COUNT: CPT

## 2020-08-21 PROCEDURE — 87210 SMEAR WET MOUNT SALINE/INK: CPT | Mod: QW,S$GLB,, | Performed by: STUDENT IN AN ORGANIZED HEALTH CARE EDUCATION/TRAINING PROGRAM

## 2020-08-21 PROCEDURE — 87220 POCT KOH: ICD-10-PCS | Mod: S$GLB,,, | Performed by: STUDENT IN AN ORGANIZED HEALTH CARE EDUCATION/TRAINING PROGRAM

## 2020-08-21 PROCEDURE — 87210 POCT WET PREP: ICD-10-PCS | Mod: QW,S$GLB,, | Performed by: STUDENT IN AN ORGANIZED HEALTH CARE EDUCATION/TRAINING PROGRAM

## 2020-08-21 PROCEDURE — 99999 PR PBB SHADOW E&M-EST. PATIENT-LVL IV: CPT | Mod: PBBFAC,,, | Performed by: STUDENT IN AN ORGANIZED HEALTH CARE EDUCATION/TRAINING PROGRAM

## 2020-08-21 PROCEDURE — 87624 HPV HI-RISK TYP POOLED RSLT: CPT

## 2020-08-21 PROCEDURE — 81001 URINALYSIS AUTO W/SCOPE: CPT

## 2020-08-21 PROCEDURE — 88175 CYTOPATH C/V AUTO FLUID REDO: CPT

## 2020-08-21 PROCEDURE — 87220 TISSUE EXAM FOR FUNGI: CPT | Mod: S$GLB,,, | Performed by: STUDENT IN AN ORGANIZED HEALTH CARE EDUCATION/TRAINING PROGRAM

## 2020-08-21 PROCEDURE — 3008F PR BODY MASS INDEX (BMI) DOCUMENTED: ICD-10-PCS | Mod: CPTII,S$GLB,, | Performed by: STUDENT IN AN ORGANIZED HEALTH CARE EDUCATION/TRAINING PROGRAM

## 2020-08-21 PROCEDURE — 84443 ASSAY THYROID STIM HORMONE: CPT

## 2020-08-21 RX ORDER — ACETAMINOPHEN 500 MG
1 TABLET ORAL
COMMUNITY
End: 2021-11-30

## 2020-08-21 RX ORDER — PHENTERMINE HYDROCHLORIDE 37.5 MG/1
TABLET ORAL
COMMUNITY
Start: 2020-06-05 | End: 2021-11-30

## 2020-08-21 RX ORDER — METRONIDAZOLE 500 MG/1
500 TABLET ORAL 2 TIMES DAILY
Qty: 14 TABLET | Refills: 0 | Status: SHIPPED | OUTPATIENT
Start: 2020-08-21 | End: 2020-08-28

## 2020-08-21 NOTE — PROGRESS NOTES
Chief Complaint: Well Woman Exam     HPI:      Dior Godwin is a 60 y.o.  who presents today for well woman exam.  LMP: No LMP recorded. Patient is postmenopausal.  She has several concerns today.  Reports post coital spotting for the past 2-3 months.  No bleeding otherwise.  No pain.  Does report some discharge and an odor.  Has had this happen previously and symptoms resolved.  Seemed to worsen when she came off of HRT.  Was on HRT for a few years and starting this year was unable to get any refills.  She reports her hot flushes have recurred over the past 3 months.  No other issues, problems, or complaints. Specifically, patient denies vaginal discharge, pelvic pain, urinary problems, or changes in appetite.  Ms. Godwin is currently sexually active with a single male partner.  She declines STD screening today.    Previous Pap: 2019 no abnormalities  Previous Mammogram:  Birads 1  Most Recent Dexa:  Osteopenia  Colonoscopy: Needs    GYN Hx:  Menarche 14.  Menopause 54.  Denies any history of abnormal pap smears or STIs.  Denies history of abnormal or heavy menses.      OB History        1    Para   1    Term           1    AB        Living   1       SAB        TAB        Ectopic        Multiple        Live Births   1           Obstetric Comments   Menarche age 14           Past Medical History:   Diagnosis Date    Arthritis     Hypertension      Past Surgical History:   Procedure Laterality Date    CERVICAL POLYPECTOMY      CHOLECYSTECTOMY      FINGER SURGERY       Social History     Socioeconomic History    Marital status:      Spouse name: Not on file    Number of children: Not on file    Years of education: Not on file    Highest education level: Not on file   Occupational History    Not on file   Social Needs    Financial resource strain: Not on file    Food insecurity     Worry: Not on file     Inability: Not on file    Transportation needs      Medical: Not on file     Non-medical: Not on file   Tobacco Use    Smoking status: Never Smoker    Smokeless tobacco: Never Used   Substance and Sexual Activity    Alcohol use: Yes     Comment: rarely    Drug use: No    Sexual activity: Yes     Partners: Male   Lifestyle    Physical activity     Days per week: Not on file     Minutes per session: Not on file    Stress: Not on file   Relationships    Social connections     Talks on phone: Not on file     Gets together: Not on file     Attends Congregation service: Not on file     Active member of club or organization: Not on file     Attends meetings of clubs or organizations: Not on file     Relationship status: Not on file   Other Topics Concern    Not on file   Social History Narrative    Not on file     Family History   Problem Relation Age of Onset    Hypertension Father     Heart attack Father     Hypertension Mother     Hypertension Brother     Hypertension Brother     Breast cancer Neg Hx     Colon cancer Neg Hx     Ovarian cancer Neg Hx        Current Outpatient Medications:     cholecalciferol, vitamin D3, 125 mcg (5,000 unit) Tab, Take 1 tablet by mouth., Disp: , Rfl:     citalopram (CELEXA) 40 MG tablet, Take 40 mg by mouth once daily. , Disp: , Rfl:     dicyclomine (BENTYL) 20 mg tablet, Take 20 mg by mouth every 6 (six) hours as needed. , Disp: , Rfl:     diltiaZEM (CARDIZEM CD) 300 MG 24 hr capsule, Take 300 mg by mouth once daily., Disp: , Rfl:     ergocalciferol (VITAMIN D2) 50,000 unit Cap, Take 50,000 Units by mouth every 7 days., Disp: , Rfl:     ibuprofen (ADVIL,MOTRIN) 200 MG tablet, Take 400 mg by mouth every 6 (six) hours as needed for Pain., Disp: , Rfl:     phentermine (ADIPEX-P) 37.5 mg tablet, TAKE 1 TABLET BY MOUTH EVERY MORNING OR 1/2 TABLET TWICE DAILY, Disp: , Rfl:     estrogen, conjugated,-medroxyprogesterone 0.3-1.5 mg (PREMPRO) 0.3-1.5 mg per tablet, Take 1 tablet by mouth once daily., Disp: 30 tablet, Rfl:  "11    omeprazole (PRILOSEC) 20 MG capsule, Take 2 capsules (40 mg total) by mouth once daily., Disp: 30 capsule, Rfl: 0    ROS:     GENERAL: Denies unintentional weight gain or weight loss. Feeling well overall.   SKIN: Denies rash or lesions.   HEENT: Denies headaches, or vision changes.   CARDIOVASCULAR: Denies palpitations or chest pain.   RESPIRATORY: Denies shortness of breath or dyspnea on exertion.  BREASTS: Denies pain, lumps, or nipple discharge.   ABDOMEN: Denies abdominal pain, constipation, diarrhea, nausea, vomiting, change in appetite.  URINARY: Denies frequency, dysuria, hematuria.  NEUROLOGIC: Denies syncope or weakness.   PSYCHIATRIC: Denies depression, anxiety or mood swings.    Physical Exam:      PHYSICAL EXAM:  /78   Ht 5' 4" (1.626 m)   Wt 75.7 kg (166 lb 14.2 oz)   BMI 28.65 kg/m²   Body mass index is 28.65 kg/m².     APPEARANCE: Well nourished, well developed, in no acute distress.  PSYCH: Appropriate mood and affect.  SKIN: No acne or hirsutism.  NECK: Neck symmetric without masses or thyromegaly.  NODES: No inguinal, axillary, or supraclavicular lymph node enlargement.  CHEST: Normal respiratory effort.    CARDIOVASCULAR:  Regular rate and rhythm.  LUNGS:  Clear to auscultation bilaterally.  BREASTS: Symmetrical, no skin changes or visible lesions.  No palpable masses or nipple discharge bilaterally.  ABDOMEN: Soft.  No tenderness or masses.    PELVIC: Normal external genitalia without lesions.  Normal hair distribution.  Adequate perineal body, normal urethral meatus.  Vagina moist and well rugated without lesions or discharge.  Cervix pink, without lesions, discharge or tenderness.  No significant cystocele or rectocele.  Bimanual exam shows uterus to be normal size, regular, mobile and nontender.  Adnexa without masses or tenderness.    EXTREMITIES: No edema.  No tenderness to palpation.    Wet prep +bacterial vaginosis    Assessment/Plan:     60 y.o.     Visit for " screening mammogram  -     Mammo Digital Screening Bilat w/ Darryl; Future; Expected date: 2020    Encounter for screening for cervical cancer   -     Liquid-Based Pap Smear, Screening  -     HPV High Risk Genotypes, PCR    Postcoital bleeding  -     US Pelvis Comp with Transvag NON-OB (xpd; Future; Expected date: 2020  -     Cancel: POCT urine dipstick without microscope  -     Urine culture  -     Urinalysis    Negative pregnancy test    Vaginal discharge  -     POCT WET PREP  -     POCT KOH    Menopausal hot flushes  -     TSH; Future; Expected date: 2020  -     Follicle stimulating hormone; Future; Expected date: 2020  -     Estradiol; Future; Expected date: 2020    Well woman exam          Counselin.  Annual exam performed today without difficulty.  Patient was counseled today on current ASCCP pap guidelines, the recommendation for yearly pelvic exams, healthy diet and exercise routines, annual mammograms.  Mammogram ordered.  Pap smear collected.  All questions answered.    2.  Postcoital spotting:  U/S and pap.  TSH.  3.  Hot flushes:  Will follow up labs.  4.  Bacterial vaginosis:  Flagyl to pharmacy.  No EtOH while on meds.  She will call if symptoms worsen or do not improve.  5.  Follow up with PCP for other health maintenance.  6.  Follow up in about 2 weeks (around 2020).     Use of the [employer] Patient Portal discussed and encouraged during today's visit.

## 2020-08-22 LAB
BACTERIA UR CULT: NORMAL
BACTERIA UR CULT: NORMAL

## 2020-08-27 ENCOUNTER — TELEPHONE (OUTPATIENT)
Dept: OBSTETRICS AND GYNECOLOGY | Facility: CLINIC | Age: 60
End: 2020-08-27

## 2020-08-27 LAB
HPV HR 12 DNA SPEC QL NAA+PROBE: POSITIVE
HPV16 AG SPEC QL: NEGATIVE
HPV18 DNA SPEC QL NAA+PROBE: NEGATIVE

## 2020-09-03 LAB
FINAL PATHOLOGIC DIAGNOSIS: NORMAL
Lab: NORMAL

## 2020-09-08 ENCOUNTER — TELEPHONE (OUTPATIENT)
Dept: OBSTETRICS AND GYNECOLOGY | Facility: HOSPITAL | Age: 60
End: 2020-09-08

## 2020-09-08 NOTE — TELEPHONE ENCOUNTER
Spoke with patient and all questions answered.  Pap NEM, HPV non 16/18 positive.  Will repeat in 1 year or sooner if needed.

## 2020-09-09 ENCOUNTER — TELEPHONE (OUTPATIENT)
Dept: OBSTETRICS AND GYNECOLOGY | Facility: CLINIC | Age: 60
End: 2020-09-09

## 2020-09-09 NOTE — TELEPHONE ENCOUNTER
Tried to contact the patient to inform of normal test results from most recent mammogram. No answer. Left a  Message with normal results and  for the patient to call the clinic with any question.

## 2020-09-10 ENCOUNTER — OFFICE VISIT (OUTPATIENT)
Dept: OBSTETRICS AND GYNECOLOGY | Facility: CLINIC | Age: 60
End: 2020-09-10
Attending: STUDENT IN AN ORGANIZED HEALTH CARE EDUCATION/TRAINING PROGRAM
Payer: COMMERCIAL

## 2020-09-10 VITALS — BODY MASS INDEX: 28.65 KG/M2 | DIASTOLIC BLOOD PRESSURE: 72 MMHG | SYSTOLIC BLOOD PRESSURE: 122 MMHG | HEIGHT: 64 IN

## 2020-09-10 DIAGNOSIS — N95.2 VAGINAL ATROPHY: Primary | ICD-10-CM

## 2020-09-10 PROCEDURE — 99213 PR OFFICE/OUTPT VISIT, EST, LEVL III, 20-29 MIN: ICD-10-PCS | Mod: S$GLB,,, | Performed by: STUDENT IN AN ORGANIZED HEALTH CARE EDUCATION/TRAINING PROGRAM

## 2020-09-10 PROCEDURE — 99999 PR PBB SHADOW E&M-EST. PATIENT-LVL III: ICD-10-PCS | Mod: PBBFAC,,, | Performed by: STUDENT IN AN ORGANIZED HEALTH CARE EDUCATION/TRAINING PROGRAM

## 2020-09-10 PROCEDURE — 99999 PR PBB SHADOW E&M-EST. PATIENT-LVL III: CPT | Mod: PBBFAC,,, | Performed by: STUDENT IN AN ORGANIZED HEALTH CARE EDUCATION/TRAINING PROGRAM

## 2020-09-10 PROCEDURE — 3008F PR BODY MASS INDEX (BMI) DOCUMENTED: ICD-10-PCS | Mod: CPTII,S$GLB,, | Performed by: STUDENT IN AN ORGANIZED HEALTH CARE EDUCATION/TRAINING PROGRAM

## 2020-09-10 PROCEDURE — 99213 OFFICE O/P EST LOW 20 MIN: CPT | Mod: S$GLB,,, | Performed by: STUDENT IN AN ORGANIZED HEALTH CARE EDUCATION/TRAINING PROGRAM

## 2020-09-10 PROCEDURE — 3008F BODY MASS INDEX DOCD: CPT | Mod: CPTII,S$GLB,, | Performed by: STUDENT IN AN ORGANIZED HEALTH CARE EDUCATION/TRAINING PROGRAM

## 2020-09-10 RX ORDER — ESTRADIOL 1 MG/1
1 TABLET ORAL DAILY
Qty: 30 TABLET | Refills: 11 | Status: SHIPPED | OUTPATIENT
Start: 2020-09-10 | End: 2021-10-18

## 2020-09-10 RX ORDER — PROGESTERONE 100 MG/1
100 CAPSULE ORAL NIGHTLY
Qty: 30 CAPSULE | Refills: 11 | Status: SHIPPED | OUTPATIENT
Start: 2020-09-10 | End: 2021-10-18

## 2020-09-14 NOTE — PROGRESS NOTES
Chief Complaint: U/S Results     HPI:      Dior Godwin is a 60 y.o.  who presents today for follow up of U/S results.  LMP: No LMP recorded. Patient is postmenopausal.   Had several episodes or post coital spotting.  Has not recurred.  Does still have some dryness.  No other issues, problems, or complaints.     OB History        1    Para   1    Term           1    AB        Living   1       SAB        TAB        Ectopic        Multiple        Live Births   1           Obstetric Comments   Menarche age 14           Past Medical History:   Diagnosis Date    Arthritis     Hypertension      Past Surgical History:   Procedure Laterality Date    CERVICAL POLYPECTOMY      CHOLECYSTECTOMY      FINGER SURGERY       Social History     Socioeconomic History    Marital status:      Spouse name: Not on file    Number of children: Not on file    Years of education: Not on file    Highest education level: Not on file   Occupational History    Not on file   Social Needs    Financial resource strain: Not on file    Food insecurity     Worry: Not on file     Inability: Not on file    Transportation needs     Medical: Not on file     Non-medical: Not on file   Tobacco Use    Smoking status: Never Smoker    Smokeless tobacco: Never Used   Substance and Sexual Activity    Alcohol use: Yes     Comment: rarely    Drug use: No    Sexual activity: Yes     Partners: Male   Lifestyle    Physical activity     Days per week: Not on file     Minutes per session: Not on file    Stress: Not on file   Relationships    Social connections     Talks on phone: Not on file     Gets together: Not on file     Attends Latter day service: Not on file     Active member of club or organization: Not on file     Attends meetings of clubs or organizations: Not on file     Relationship status: Not on file   Other Topics Concern    Not on file   Social History Narrative    Not on file     Family History    Problem Relation Age of Onset    Hypertension Father     Heart attack Father     Hypertension Mother     Hypertension Brother     Hypertension Brother     Breast cancer Neg Hx     Colon cancer Neg Hx     Ovarian cancer Neg Hx        Current Outpatient Medications:     cholecalciferol, vitamin D3, 125 mcg (5,000 unit) Tab, Take 1 tablet by mouth., Disp: , Rfl:     citalopram (CELEXA) 40 MG tablet, Take 40 mg by mouth once daily. , Disp: , Rfl:     dicyclomine (BENTYL) 20 mg tablet, Take 20 mg by mouth every 6 (six) hours as needed. , Disp: , Rfl:     diltiaZEM (CARDIZEM CD) 300 MG 24 hr capsule, Take 300 mg by mouth once daily., Disp: , Rfl:     ergocalciferol (VITAMIN D2) 50,000 unit Cap, Take 50,000 Units by mouth every 7 days., Disp: , Rfl:     ibuprofen (ADVIL,MOTRIN) 200 MG tablet, Take 400 mg by mouth every 6 (six) hours as needed for Pain., Disp: , Rfl:     phentermine (ADIPEX-P) 37.5 mg tablet, TAKE 1 TABLET BY MOUTH EVERY MORNING OR 1/2 TABLET TWICE DAILY, Disp: , Rfl:     estradioL (ESTRACE) 1 MG tablet, Take 1 tablet (1 mg total) by mouth once daily., Disp: 30 tablet, Rfl: 11    estrogen, conjugated,-medroxyprogesterone 0.3-1.5 mg (PREMPRO) 0.3-1.5 mg per tablet, Take 1 tablet by mouth once daily., Disp: 30 tablet, Rfl: 11    omeprazole (PRILOSEC) 20 MG capsule, Take 2 capsules (40 mg total) by mouth once daily., Disp: 30 capsule, Rfl: 0    progesterone (PROMETRIUM) 100 MG capsule, Take 1 capsule (100 mg total) by mouth nightly., Disp: 30 capsule, Rfl: 11    ROS:     GENERAL: Denies unintentional weight gain or weight loss. Feeling well overall.   SKIN: Denies rash or lesions.   HEENT: Denies headaches, or vision changes.   ABDOMEN: Denies abdominal pain, constipation, diarrhea, nausea, vomiting, change in appetite.  URINARY: Denies frequency, dysuria, hematuria.  NEUROLOGIC: Denies syncope or weakness.   PSYCHIATRIC: Denies depression, anxiety or mood swings.    Physical Exam:     "  PHYSICAL EXAM:  /72   Ht 5' 4" (1.626 m)   BMI 28.65 kg/m²   Body mass index is 28.65 kg/m².     APPEARANCE: Well nourished, well developed, in no acute distress.  PSYCH: Appropriate mood and affect.  SKIN: No acne or hirsutism.      Assessment/Plan:     60 y.o.     Vaginal atrophy    Other orders  -     estradioL (ESTRACE) 1 MG tablet; Take 1 tablet (1 mg total) by mouth once daily.  Dispense: 30 tablet; Refill: 11  -     progesterone (PROMETRIUM) 100 MG capsule; Take 1 capsule (100 mg total) by mouth nightly.  Dispense: 30 capsule; Refill: 11          Counselin.  Vaginal atrophy:  U/S results reviewed with patient.  Patient also with hot flushes.  Symptoms were improved on HRT.  R/B/A reviewed and all questions answered regarding restarting HRT.  A full discussion of the risks and benefits of hormone replacement therapy was carried out. Specifically, improvement in vasomotor and other climacteric symptoms, and a reduced risk for osteoporosis was explained.  We discussed the study data showing increased risk of thrombo-embolic events such as myocardial infarction, stroke and also possibly breast cancer with estrogen replacement. The patient was informed that these are not the only risks of HRT, and she was encouraged to read the medication packaging insert for a full list of side effects. We also discussed ACOG's recommendation to use hormone replacement therapy for the relief of menopausal symptoms on the lowest dose possible for the shortest amount of time. Alternatives to hormonal therapies, such as SSRIs/SNRIs were discussed.  Reviewed and discussed medical history and patient does not have any contraindications to treatment.  All of her questions about this therapy were answered, she voiced understanding, and wishes to proceed with HRT.  She will call if symptoms recur.    2.  Follow up with PCP for other health maintenance.  3.  RTC for annual exam or sooner if needed.    Use of the " [employer] Patient Portal discussed and encouraged during today's visit.

## 2021-10-18 RX ORDER — PROGESTERONE 100 MG/1
CAPSULE ORAL
Qty: 30 CAPSULE | Refills: 0 | Status: SHIPPED | OUTPATIENT
Start: 2021-10-18 | End: 2021-11-16

## 2021-10-18 RX ORDER — ESTRADIOL 1 MG/1
TABLET ORAL
Qty: 30 TABLET | Refills: 0 | Status: SHIPPED | OUTPATIENT
Start: 2021-10-18 | End: 2021-11-16

## 2021-11-30 ENCOUNTER — OFFICE VISIT (OUTPATIENT)
Dept: OBSTETRICS AND GYNECOLOGY | Facility: CLINIC | Age: 61
End: 2021-11-30
Attending: STUDENT IN AN ORGANIZED HEALTH CARE EDUCATION/TRAINING PROGRAM
Payer: COMMERCIAL

## 2021-11-30 VITALS
HEIGHT: 64 IN | BODY MASS INDEX: 31.24 KG/M2 | WEIGHT: 183 LBS | DIASTOLIC BLOOD PRESSURE: 86 MMHG | SYSTOLIC BLOOD PRESSURE: 124 MMHG

## 2021-11-30 DIAGNOSIS — Z11.51 SCREENING FOR HPV (HUMAN PAPILLOMAVIRUS): ICD-10-CM

## 2021-11-30 DIAGNOSIS — Z12.31 VISIT FOR SCREENING MAMMOGRAM: ICD-10-CM

## 2021-11-30 DIAGNOSIS — Z12.4 SCREENING FOR CERVICAL CANCER: Primary | ICD-10-CM

## 2021-11-30 DIAGNOSIS — R87.810 CERVICAL HIGH RISK HUMAN PAPILLOMAVIRUS (HPV) DNA TEST POSITIVE: ICD-10-CM

## 2021-11-30 PROCEDURE — 88141 PR  CYTOPATH CERV/VAG INTERPRET: ICD-10-PCS | Mod: ,,, | Performed by: PATHOLOGY

## 2021-11-30 PROCEDURE — 88175 CYTOPATH C/V AUTO FLUID REDO: CPT | Performed by: PATHOLOGY

## 2021-11-30 PROCEDURE — 88141 CYTOPATH C/V INTERPRET: CPT | Mod: ,,, | Performed by: PATHOLOGY

## 2021-11-30 PROCEDURE — 87624 HPV HI-RISK TYP POOLED RSLT: CPT | Performed by: STUDENT IN AN ORGANIZED HEALTH CARE EDUCATION/TRAINING PROGRAM

## 2021-11-30 PROCEDURE — 99999 PR PBB SHADOW E&M-EST. PATIENT-LVL III: ICD-10-PCS | Mod: PBBFAC,,, | Performed by: STUDENT IN AN ORGANIZED HEALTH CARE EDUCATION/TRAINING PROGRAM

## 2021-11-30 PROCEDURE — 99396 PREV VISIT EST AGE 40-64: CPT | Mod: S$GLB,,, | Performed by: STUDENT IN AN ORGANIZED HEALTH CARE EDUCATION/TRAINING PROGRAM

## 2021-11-30 PROCEDURE — 99999 PR PBB SHADOW E&M-EST. PATIENT-LVL III: CPT | Mod: PBBFAC,,, | Performed by: STUDENT IN AN ORGANIZED HEALTH CARE EDUCATION/TRAINING PROGRAM

## 2021-11-30 PROCEDURE — 99396 PR PREVENTIVE VISIT,EST,40-64: ICD-10-PCS | Mod: S$GLB,,, | Performed by: STUDENT IN AN ORGANIZED HEALTH CARE EDUCATION/TRAINING PROGRAM

## 2021-12-06 ENCOUNTER — TELEPHONE (OUTPATIENT)
Dept: OBSTETRICS AND GYNECOLOGY | Facility: CLINIC | Age: 61
End: 2021-12-06
Payer: COMMERCIAL

## 2021-12-07 LAB
FINAL PATHOLOGIC DIAGNOSIS: NORMAL
Lab: NORMAL

## 2021-12-16 ENCOUNTER — TELEPHONE (OUTPATIENT)
Dept: OBSTETRICS AND GYNECOLOGY | Facility: CLINIC | Age: 61
End: 2021-12-16
Payer: COMMERCIAL

## 2022-01-24 ENCOUNTER — PROCEDURE VISIT (OUTPATIENT)
Dept: OBSTETRICS AND GYNECOLOGY | Facility: CLINIC | Age: 62
End: 2022-01-24
Attending: STUDENT IN AN ORGANIZED HEALTH CARE EDUCATION/TRAINING PROGRAM
Payer: COMMERCIAL

## 2022-01-24 VITALS
DIASTOLIC BLOOD PRESSURE: 78 MMHG | SYSTOLIC BLOOD PRESSURE: 138 MMHG | WEIGHT: 182.44 LBS | HEIGHT: 64 IN | BODY MASS INDEX: 31.15 KG/M2

## 2022-01-24 DIAGNOSIS — R87.810 CERVICAL HIGH RISK HUMAN PAPILLOMAVIRUS (HPV) DNA TEST POSITIVE: ICD-10-CM

## 2022-01-24 DIAGNOSIS — Z01.812 PRE-PROCEDURE LAB EXAM: Primary | ICD-10-CM

## 2022-01-24 LAB
B-HCG UR QL: NEGATIVE
CTP QC/QA: YES

## 2022-01-24 PROCEDURE — 57456 ENDOCERV CURETTAGE W/SCOPE: CPT | Mod: S$GLB,,, | Performed by: STUDENT IN AN ORGANIZED HEALTH CARE EDUCATION/TRAINING PROGRAM

## 2022-01-24 PROCEDURE — 88305 TISSUE EXAM BY PATHOLOGIST: CPT | Mod: 26,,, | Performed by: PATHOLOGY

## 2022-01-24 PROCEDURE — 88305 TISSUE EXAM BY PATHOLOGIST: CPT | Performed by: PATHOLOGY

## 2022-01-24 PROCEDURE — 81025 URINE PREGNANCY TEST: CPT | Mod: S$GLB,,, | Performed by: STUDENT IN AN ORGANIZED HEALTH CARE EDUCATION/TRAINING PROGRAM

## 2022-01-24 PROCEDURE — 88305 TISSUE EXAM BY PATHOLOGIST: ICD-10-PCS | Mod: 26,,, | Performed by: PATHOLOGY

## 2022-01-24 PROCEDURE — 81025 POCT URINE PREGNANCY: ICD-10-PCS | Mod: S$GLB,,, | Performed by: STUDENT IN AN ORGANIZED HEALTH CARE EDUCATION/TRAINING PROGRAM

## 2022-01-24 PROCEDURE — 57456: ICD-10-PCS | Mod: S$GLB,,, | Performed by: STUDENT IN AN ORGANIZED HEALTH CARE EDUCATION/TRAINING PROGRAM

## 2022-01-24 NOTE — PROCEDURES
Colposcopy (2021 NEM, HPV non 16/18 pos.  2020 NEM, non 16/18 pos)    Date/Time: 1/24/2022 11:30 AM  Performed by: Shannan Pugh MD  Authorized by: Shannan Pugh MD     Consent Done?:  Yes (Written)  Assistants?: No      Colposcopy Site:  Cervix  Position:  Supine  Acrowhite Lesion: No    Atypical Vessels: No    Transformation Zone Adequate?: Yes    Biopsy?: No    ECC Performed?: Yes    LEEP Performed?: No     Patient tolerated the procedure well with no immediate complications.   Post-operative instructions were provided for the patient.   Patient was discharged and will follow up if any complications occur     UPT negative prior to procedure.

## 2022-01-28 ENCOUNTER — TELEPHONE (OUTPATIENT)
Dept: OBSTETRICS AND GYNECOLOGY | Facility: CLINIC | Age: 62
End: 2022-01-28
Payer: COMMERCIAL

## 2022-01-28 LAB
FINAL PATHOLOGIC DIAGNOSIS: NORMAL
GROSS: NORMAL
Lab: NORMAL

## 2022-01-28 NOTE — TELEPHONE ENCOUNTER
Spoke with patient and all questions answered.  CITLALY I on colposcopy.  Will plan for repeat pap smear and HPV in 1 year.  Patient voiced agreement.

## 2022-06-24 NOTE — TELEPHONE ENCOUNTER
Spoke to pt regarding concerns If tests were faxed over. Informed pt I didn't see anything in the computer yet.    yes

## 2022-12-14 RX ORDER — PROGESTERONE 100 MG/1
CAPSULE ORAL
Qty: 30 CAPSULE | Refills: 10 | OUTPATIENT
Start: 2022-12-14

## 2023-03-16 ENCOUNTER — OFFICE VISIT (OUTPATIENT)
Dept: OBSTETRICS AND GYNECOLOGY | Facility: CLINIC | Age: 63
End: 2023-03-16
Attending: STUDENT IN AN ORGANIZED HEALTH CARE EDUCATION/TRAINING PROGRAM
Payer: COMMERCIAL

## 2023-03-16 ENCOUNTER — LAB VISIT (OUTPATIENT)
Dept: LAB | Facility: OTHER | Age: 63
End: 2023-03-16
Attending: STUDENT IN AN ORGANIZED HEALTH CARE EDUCATION/TRAINING PROGRAM
Payer: COMMERCIAL

## 2023-03-16 VITALS
HEIGHT: 64 IN | BODY MASS INDEX: 31.42 KG/M2 | SYSTOLIC BLOOD PRESSURE: 124 MMHG | DIASTOLIC BLOOD PRESSURE: 70 MMHG | WEIGHT: 184.06 LBS

## 2023-03-16 DIAGNOSIS — Z11.51 ENCOUNTER FOR SCREENING FOR HUMAN PAPILLOMAVIRUS (HPV): ICD-10-CM

## 2023-03-16 DIAGNOSIS — L67.8 ABNORMAL FACIAL HAIR: ICD-10-CM

## 2023-03-16 DIAGNOSIS — Z12.4 ENCOUNTER FOR PAPANICOLAOU SMEAR FOR CERVICAL CANCER SCREENING: ICD-10-CM

## 2023-03-16 DIAGNOSIS — Z12.31 BREAST CANCER SCREENING BY MAMMOGRAM: ICD-10-CM

## 2023-03-16 DIAGNOSIS — Z01.419 ENCOUNTER FOR GYNECOLOGICAL EXAMINATION: Primary | ICD-10-CM

## 2023-03-16 LAB
ESTRADIOL SERPL-MCNC: 50 PG/ML
PROGEST SERPL-MCNC: 0.8 NG/ML

## 2023-03-16 PROCEDURE — 1160F PR REVIEW ALL MEDS BY PRESCRIBER/CLIN PHARMACIST DOCUMENTED: ICD-10-PCS | Mod: CPTII,S$GLB,, | Performed by: STUDENT IN AN ORGANIZED HEALTH CARE EDUCATION/TRAINING PROGRAM

## 2023-03-16 PROCEDURE — 84270 ASSAY OF SEX HORMONE GLOBUL: CPT | Performed by: STUDENT IN AN ORGANIZED HEALTH CARE EDUCATION/TRAINING PROGRAM

## 2023-03-16 PROCEDURE — 3008F PR BODY MASS INDEX (BMI) DOCUMENTED: ICD-10-PCS | Mod: CPTII,S$GLB,, | Performed by: STUDENT IN AN ORGANIZED HEALTH CARE EDUCATION/TRAINING PROGRAM

## 2023-03-16 PROCEDURE — 99999 PR PBB SHADOW E&M-EST. PATIENT-LVL III: CPT | Mod: PBBFAC,,, | Performed by: STUDENT IN AN ORGANIZED HEALTH CARE EDUCATION/TRAINING PROGRAM

## 2023-03-16 PROCEDURE — 3008F BODY MASS INDEX DOCD: CPT | Mod: CPTII,S$GLB,, | Performed by: STUDENT IN AN ORGANIZED HEALTH CARE EDUCATION/TRAINING PROGRAM

## 2023-03-16 PROCEDURE — 99396 PREV VISIT EST AGE 40-64: CPT | Mod: S$GLB,,, | Performed by: STUDENT IN AN ORGANIZED HEALTH CARE EDUCATION/TRAINING PROGRAM

## 2023-03-16 PROCEDURE — 82670 ASSAY OF TOTAL ESTRADIOL: CPT | Performed by: STUDENT IN AN ORGANIZED HEALTH CARE EDUCATION/TRAINING PROGRAM

## 2023-03-16 PROCEDURE — 84403 ASSAY OF TOTAL TESTOSTERONE: CPT | Performed by: STUDENT IN AN ORGANIZED HEALTH CARE EDUCATION/TRAINING PROGRAM

## 2023-03-16 PROCEDURE — 3074F PR MOST RECENT SYSTOLIC BLOOD PRESSURE < 130 MM HG: ICD-10-PCS | Mod: CPTII,S$GLB,, | Performed by: STUDENT IN AN ORGANIZED HEALTH CARE EDUCATION/TRAINING PROGRAM

## 2023-03-16 PROCEDURE — 1159F PR MEDICATION LIST DOCUMENTED IN MEDICAL RECORD: ICD-10-PCS | Mod: CPTII,S$GLB,, | Performed by: STUDENT IN AN ORGANIZED HEALTH CARE EDUCATION/TRAINING PROGRAM

## 2023-03-16 PROCEDURE — 84144 ASSAY OF PROGESTERONE: CPT | Performed by: STUDENT IN AN ORGANIZED HEALTH CARE EDUCATION/TRAINING PROGRAM

## 2023-03-16 PROCEDURE — 82530 CORTISOL FREE: CPT | Performed by: STUDENT IN AN ORGANIZED HEALTH CARE EDUCATION/TRAINING PROGRAM

## 2023-03-16 PROCEDURE — 3078F PR MOST RECENT DIASTOLIC BLOOD PRESSURE < 80 MM HG: ICD-10-PCS | Mod: CPTII,S$GLB,, | Performed by: STUDENT IN AN ORGANIZED HEALTH CARE EDUCATION/TRAINING PROGRAM

## 2023-03-16 PROCEDURE — 88175 CYTOPATH C/V AUTO FLUID REDO: CPT | Performed by: STUDENT IN AN ORGANIZED HEALTH CARE EDUCATION/TRAINING PROGRAM

## 2023-03-16 PROCEDURE — 99396 PR PREVENTIVE VISIT,EST,40-64: ICD-10-PCS | Mod: S$GLB,,, | Performed by: STUDENT IN AN ORGANIZED HEALTH CARE EDUCATION/TRAINING PROGRAM

## 2023-03-16 PROCEDURE — 87624 HPV HI-RISK TYP POOLED RSLT: CPT | Performed by: STUDENT IN AN ORGANIZED HEALTH CARE EDUCATION/TRAINING PROGRAM

## 2023-03-16 PROCEDURE — 3074F SYST BP LT 130 MM HG: CPT | Mod: CPTII,S$GLB,, | Performed by: STUDENT IN AN ORGANIZED HEALTH CARE EDUCATION/TRAINING PROGRAM

## 2023-03-16 PROCEDURE — 3078F DIAST BP <80 MM HG: CPT | Mod: CPTII,S$GLB,, | Performed by: STUDENT IN AN ORGANIZED HEALTH CARE EDUCATION/TRAINING PROGRAM

## 2023-03-16 PROCEDURE — 36415 COLL VENOUS BLD VENIPUNCTURE: CPT | Performed by: STUDENT IN AN ORGANIZED HEALTH CARE EDUCATION/TRAINING PROGRAM

## 2023-03-16 PROCEDURE — 1159F MED LIST DOCD IN RCRD: CPT | Mod: CPTII,S$GLB,, | Performed by: STUDENT IN AN ORGANIZED HEALTH CARE EDUCATION/TRAINING PROGRAM

## 2023-03-16 PROCEDURE — 99999 PR PBB SHADOW E&M-EST. PATIENT-LVL III: ICD-10-PCS | Mod: PBBFAC,,, | Performed by: STUDENT IN AN ORGANIZED HEALTH CARE EDUCATION/TRAINING PROGRAM

## 2023-03-16 PROCEDURE — 1160F RVW MEDS BY RX/DR IN RCRD: CPT | Mod: CPTII,S$GLB,, | Performed by: STUDENT IN AN ORGANIZED HEALTH CARE EDUCATION/TRAINING PROGRAM

## 2023-03-16 NOTE — PROGRESS NOTES
"Chief Complaint: Well Woman Exam     HPI:      Dior Godwin is a 63 y.o.  who presents today for well woman exam. Patient is postmenopausal.  C/o decreased sex drive, abnormal facial hair growth, and difficulty with weight loss. Currently on HRT estrace 1 mg and prometrium 100 mg - has noticed little difference in symptoms. No other complaints. Specifically, patient denies abnormal vaginal bleeding, discharge, pelvic pain, urinary problems, or changes in appetite. Ms. Godwin is currently sexually active with a single male partner.     Previous Pap: CIN1 w/negative ECC in  (pap prior to that NILM +oHRHPV)  Previous Mammogram: BiRads: 2  (22)  Most Recent Colonoscopy: reports UTD     Past Medical History:   Diagnosis Date    Arthritis     Hypertension      Past Surgical History:   Procedure Laterality Date    CERVICAL POLYPECTOMY      CHOLECYSTECTOMY      FINGER SURGERY       Social History     Socioeconomic History    Marital status:    Tobacco Use    Smoking status: Never    Smokeless tobacco: Never   Substance and Sexual Activity    Alcohol use: Yes     Comment: rarely    Drug use: No    Sexual activity: Yes     Partners: Male     Birth control/protection: Post-menopausal     Family History   Problem Relation Age of Onset    Hypertension Father     Heart attack Father     Hypertension Mother     Hypertension Brother     Hypertension Brother     Breast cancer Neg Hx     Colon cancer Neg Hx     Ovarian cancer Neg Hx      Review of patient's allergies indicates:  No Known Allergies    OB History          1    Para   1    Term           1    AB        Living   1         SAB        IAB        Ectopic        Multiple        Live Births   1           Obstetric Comments   Menarche age 14             Physical Exam:      PHYSICAL EXAM:  /70 (BP Location: Left arm, Patient Position: Sitting, BP Method: Medium (Manual))   Ht 5' 4" (1.626 m)   Wt 83.5 kg (184 lb 1.4 oz)   BMI " 31.60 kg/m²   Body mass index is 31.6 kg/m².     APPEARANCE: Well nourished, well developed, in no acute distress.  PSYCH: Appropriate mood and affect.  SKIN: No acne or hirsutism  NECK: Neck symmetric without masses  NODES: No inguinal, axillary, or supraclavicular lymph node enlargement  ABDOMEN: Soft.  No tenderness or masses.    CARDIOVASCULAR: No edema of peripheral extremities  BREASTS: Symmetrical, no visible skin lesions. No palpable masses. No nipple discharge bilaterally.  PELVIC: Normal external genitalia without lesions.  Normal hair distribution.  Adequate perineal body, normal urethral meatus.  Vagina moist and smooth. Without lesions. Without discharge.  Cervix pink, without lesions, discharge or tenderness.  No significant cystocele or rectocele.  Bimanual exam shows uterus to be normal size, regular, mobile and nontender.  Adnexa without masses or tenderness.      Assessment/Plan:     Encounter for gynecological examination    Breast cancer screening by mammogram  -     Mammo Digital Screening Bilat w/ Darryl; Future; Expected date: 03/16/2023    Abnormal facial hair  -     ESTRADIOL; Future; Expected date: 03/16/2023  -     Progesterone; Future  -     TESTOSTERONE PANEL; Future; Expected date: 03/16/2023  -     Cortisol, free, serum; Future; Expected date: 03/16/2023    Encounter for screening for human papillomavirus (HPV)  -     HPV High Risk Genotypes, PCR    Encounter for Papanicolaou smear for cervical cancer screening  -     Liquid-Based Pap Smear, Screening    - pelvic exam normal  - pap/hpv for follow up of CIN1  - hormone labs and testosterone panel today  - MMG due and ordered  - R/B/A HRT discussed and she desires to continue  - will re discuss management options following lab results  - routine labs with PCP    Follow up in about 1 year (around 3/16/2024) for annual exam.    Counseling:     Patient was counseled today on current ASCCP pap guidelines, the recommendation for yearly physical  exams, safe driving habits, breast self awareness and annual mammograms. She is to see her PCP for other health maintenance.     Use of the [employer] Patient Portal discussed and encouraged during today's visit.     Nguyen Bell MD  Obstetrics and Gynecology  [facility]

## 2023-03-22 LAB
HPV HR 12 DNA SPEC QL NAA+PROBE: NEGATIVE
HPV16 AG SPEC QL: NEGATIVE
HPV18 DNA SPEC QL NAA+PROBE: NEGATIVE

## 2023-03-23 LAB
ALBUMIN SERPL-MCNC: 4.5 G/DL (ref 3.6–5.1)
SHBG SERPL-SCNC: 66 NMOL/L (ref 14–73)
TESTOST FREE SERPL-MCNC: 2.2 PG/ML (ref 0.2–5)
TESTOST SERPL-MCNC: 34 NG/DL (ref 2–45)
TESTOSTERONE.FREE+WB SERPL-MCNC: 4.6 NG/DL (ref 0.5–8.5)

## 2023-03-24 LAB
FINAL PATHOLOGIC DIAGNOSIS: NORMAL
Lab: NORMAL

## 2023-03-25 LAB — CORTIS F SERPL-MCNC: 0.32 MCG/DL

## 2023-03-30 ENCOUNTER — TELEPHONE (OUTPATIENT)
Dept: OBSTETRICS AND GYNECOLOGY | Facility: CLINIC | Age: 63
End: 2023-03-30
Payer: COMMERCIAL

## 2023-03-30 DIAGNOSIS — R92.8 ABNORMAL MAMMOGRAM: Primary | ICD-10-CM

## 2023-03-30 NOTE — TELEPHONE ENCOUNTER
Called and discussed mammogram result with the patient. Areas in the right breast that require additional imaging with ultrasound. Will print and fax this order over the DIS. Pt voiced understanding.    Nguyen Bell MD  Obstetrics and Gynecology  [facility]

## 2023-08-09 RX ORDER — PROGESTERONE 100 MG/1
100 CAPSULE ORAL NIGHTLY
Qty: 90 CAPSULE | Refills: 3 | Status: SHIPPED | OUTPATIENT
Start: 2023-08-09 | End: 2024-08-08

## 2023-08-17 RX ORDER — ESTRADIOL 1 MG/1
1 TABLET ORAL DAILY
Qty: 30 TABLET | Refills: 1 | Status: SHIPPED | OUTPATIENT
Start: 2023-08-17 | End: 2023-10-27

## 2023-08-17 NOTE — TELEPHONE ENCOUNTER
Refill Routing Note     Refill Routing Note   Medication(s) are not appropriate for processing by [facility] for the following reason(s):      Medication outside of protocol  Non-participating provider    [facility] action(s):  Route Care Due:  None identified            Appointments  past 12m or future 3m with PCP    Date Provider   Last Visit   Visit date not found Nabeel Palmer MD   Next Visit   Visit date not found Nabeel Palmer MD   ED visits in past 90 days: 0        Note composed:5:08 PM 08/17/2023

## 2023-10-27 RX ORDER — ESTRADIOL 1 MG/1
1 TABLET ORAL
Qty: 90 TABLET | Refills: 2 | Status: SHIPPED | OUTPATIENT
Start: 2023-10-27 | End: 2023-10-31 | Stop reason: SDUPTHER

## 2023-11-02 RX ORDER — ESTRADIOL 1 MG/1
1 TABLET ORAL DAILY
Qty: 90 TABLET | Refills: 1 | Status: SHIPPED | OUTPATIENT
Start: 2023-11-02

## 2024-10-14 ENCOUNTER — OFFICE VISIT (OUTPATIENT)
Dept: SURGERY | Facility: CLINIC | Age: 64
End: 2024-10-14
Payer: COMMERCIAL

## 2024-10-14 VITALS
BODY MASS INDEX: 33.69 KG/M2 | HEART RATE: 112 BPM | SYSTOLIC BLOOD PRESSURE: 171 MMHG | HEIGHT: 64 IN | WEIGHT: 197.31 LBS | DIASTOLIC BLOOD PRESSURE: 96 MMHG

## 2024-10-14 DIAGNOSIS — K43.2 INCISIONAL HERNIA, WITHOUT OBSTRUCTION OR GANGRENE: Primary | ICD-10-CM

## 2024-10-14 PROCEDURE — 3080F DIAST BP >= 90 MM HG: CPT | Mod: CPTII,S$GLB,, | Performed by: SURGERY

## 2024-10-14 PROCEDURE — 99203 OFFICE O/P NEW LOW 30 MIN: CPT | Mod: S$GLB,,, | Performed by: SURGERY

## 2024-10-14 PROCEDURE — 3077F SYST BP >= 140 MM HG: CPT | Mod: CPTII,S$GLB,, | Performed by: SURGERY

## 2024-10-14 PROCEDURE — 99999 PR PBB SHADOW E&M-EST. PATIENT-LVL III: CPT | Mod: PBBFAC,,, | Performed by: SURGERY

## 2024-10-14 PROCEDURE — 3008F BODY MASS INDEX DOCD: CPT | Mod: CPTII,S$GLB,, | Performed by: SURGERY

## 2024-10-14 PROCEDURE — 1159F MED LIST DOCD IN RCRD: CPT | Mod: CPTII,S$GLB,, | Performed by: SURGERY

## 2024-10-14 NOTE — PROGRESS NOTES
65 y/o woman with history of lap guillermo in the past now with lump at one of her incisions.    PE: incarcerated upper abd hernia    Impression: incisional hernia    Plan: ct abd to eval all port sites.

## 2024-10-18 ENCOUNTER — HOSPITAL ENCOUNTER (OUTPATIENT)
Dept: RADIOLOGY | Facility: HOSPITAL | Age: 64
Discharge: HOME | End: 2024-10-18
Attending: SURGERY
Payer: COMMERCIAL

## 2024-10-18 DIAGNOSIS — K43.2 INCISIONAL HERNIA, WITHOUT OBSTRUCTION OR GANGRENE: ICD-10-CM

## 2024-10-18 PROCEDURE — 71250 CT THORAX DX C-: CPT | Mod: 26,,, | Performed by: RADIOLOGY

## 2024-10-18 PROCEDURE — 71250 CT THORAX DX C-: CPT | Mod: TC

## 2024-10-18 PROCEDURE — 74176 CT ABD & PELVIS W/O CONTRAST: CPT | Mod: 26,,, | Performed by: RADIOLOGY

## 2024-10-22 ENCOUNTER — ANESTHESIA EVENT (OUTPATIENT)
Dept: SURGERY | Facility: HOSPITAL | Age: 64
End: 2024-10-22
Payer: COMMERCIAL

## 2024-10-22 ENCOUNTER — OFFICE VISIT (OUTPATIENT)
Dept: SURGERY | Facility: CLINIC | Age: 64
End: 2024-10-22
Payer: COMMERCIAL

## 2024-10-22 DIAGNOSIS — K43.2 INCISIONAL HERNIA OF ANTERIOR ABDOMINAL WALL WITHOUT OBSTRUCTION OR GANGRENE: ICD-10-CM

## 2024-10-22 DIAGNOSIS — K43.2 INCISIONAL HERNIA, WITHOUT OBSTRUCTION OR GANGRENE: Primary | ICD-10-CM

## 2024-10-22 PROCEDURE — 99212 OFFICE O/P EST SF 10 MIN: CPT | Mod: 95,,, | Performed by: SURGERY

## 2024-10-22 RX ORDER — LIDOCAINE HYDROCHLORIDE 10 MG/ML
1 INJECTION, SOLUTION EPIDURAL; INFILTRATION; INTRACAUDAL; PERINEURAL ONCE
OUTPATIENT
Start: 2024-10-22 | End: 2024-10-22

## 2024-10-22 RX ORDER — CEFAZOLIN SODIUM 2 G/50ML
2 SOLUTION INTRAVENOUS
OUTPATIENT
Start: 2024-10-22

## 2024-10-22 RX ORDER — SODIUM CHLORIDE 9 MG/ML
INJECTION, SOLUTION INTRAVENOUS CONTINUOUS
OUTPATIENT
Start: 2024-10-22

## 2024-10-22 NOTE — H&P (VIEW-ONLY)
History and Physical Exam    Patient ID: Dior Godwin is a 64 y.o. female.    Chief Complaint: No chief complaint on file.      HPI:  63 y/o with history of abd hernia at subxiphoid incision site from lap guillermo.  No other hernias noted on CT.        Review of Systems   Constitutional:  Negative for chills and unexpected weight change.   HENT:  Negative for congestion, ear pain and sore throat.    Eyes:  Negative for pain and redness.   Respiratory:  Negative for cough and shortness of breath.    Cardiovascular:  Negative for chest pain and palpitations.   Gastrointestinal:  Negative for abdominal distention, abdominal pain and constipation.   Endocrine: Negative for cold intolerance and heat intolerance.   Genitourinary:  Negative for dysuria and frequency.   Musculoskeletal:  Negative for back pain and neck pain.   Skin:  Negative for pallor and rash.   Neurological:  Negative for syncope, light-headedness and headaches.   Hematological:  Negative for adenopathy. Does not bruise/bleed easily.   Psychiatric/Behavioral:  Negative for confusion and hallucinations.        Current Outpatient Medications   Medication Sig Dispense Refill    busPIRone (BUSPAR) 5 MG Tab Take 5 mg by mouth 3 (three) times daily.      citalopram (CELEXA) 40 MG tablet Take 40 mg by mouth once daily.       dicyclomine (BENTYL) 20 mg tablet Take 20 mg by mouth every 6 (six) hours as needed.       diltiaZEM (CARDIZEM CD) 300 MG 24 hr capsule Take 300 mg by mouth once daily.      ergocalciferol (ERGOCALCIFEROL) 50,000 unit Cap Take 50,000 Units by mouth every 7 days.      estradioL (ESTRACE) 1 MG tablet Take 1 tablet (1 mg total) by mouth once daily. 90 tablet 1    omeprazole (PRILOSEC) 20 MG capsule Take 2 capsules (40 mg total) by mouth once daily. 30 capsule 0    progesterone (PROMETRIUM) 100 MG capsule TAKE 1 CAPSULE(100 MG) BY MOUTH EVERY NIGHT 30 capsule 3     No current facility-administered medications for this visit.       Review of  patient's allergies indicates:  No Known Allergies    Past Medical History:   Diagnosis Date    Arthritis     Hypertension        Past Surgical History:   Procedure Laterality Date    CERVICAL POLYPECTOMY      CHOLECYSTECTOMY      FINGER SURGERY         Family History   Problem Relation Name Age of Onset    Hypertension Father      Heart attack Father      Hypertension Mother      Hypertension Brother      Hypertension Brother      Breast cancer Neg Hx      Colon cancer Neg Hx      Ovarian cancer Neg Hx         Social History     Socioeconomic History    Marital status:    Tobacco Use    Smoking status: Never    Smokeless tobacco: Never   Substance and Sexual Activity    Alcohol use: Yes     Comment: rarely    Drug use: No    Sexual activity: Yes     Partners: Male     Birth control/protection: Post-menopausal     Social Drivers of Health     Financial Resource Strain: Low Risk  (10/11/2024)    Overall Financial Resource Strain (CARDIA)     Difficulty of Paying Living Expenses: Not very hard   Food Insecurity: No Food Insecurity (10/11/2024)    Hunger Vital Sign     Worried About Running Out of Food in the Last Year: Never true     Ran Out of Food in the Last Year: Never true   Transportation Needs: No Transportation Needs (11/10/2022)    Received from [facility]    PRAPARE - Transportation     Lack of Transportation (Medical): No     Lack of Transportation (Non-Medical): No   Physical Activity: Inactive (10/11/2024)    Exercise Vital Sign     Days of Exercise per Week: 0 days     Minutes of Exercise per Session: 0 min   Stress: Stress Concern Present (10/11/2024)    [employer] - Occupational Stress Questionnaire     Feeling of Stress : To some extent   Housing Stability: Unknown (10/11/2024)    Housing Stability Vital Sign     Unable to Pay for Housing in the Last Year: No       There were no vitals filed for this visit.    Physical Exam    Assessment & Plan:   Plan open repair  of incisional hernia     Risks, benefits, alternatives to the procedure were discussed with the patient, who appears to understand and agree with the treatment plan.

## 2024-10-24 RX ORDER — PROGESTERONE 100 MG/1
100 CAPSULE ORAL DAILY
Qty: 30 CAPSULE | Refills: 2 | Status: SHIPPED | OUTPATIENT
Start: 2024-10-24 | End: 2025-10-24

## 2024-11-14 ENCOUNTER — HOSPITAL ENCOUNTER (OUTPATIENT)
Dept: PREADMISSION TESTING | Facility: HOSPITAL | Age: 64
Discharge: HOME | End: 2024-11-14
Attending: SURGERY
Payer: COMMERCIAL

## 2024-11-14 VITALS
OXYGEN SATURATION: 97 % | WEIGHT: 195.19 LBS | TEMPERATURE: 98 F | RESPIRATION RATE: 18 BRPM | HEART RATE: 86 BPM | SYSTOLIC BLOOD PRESSURE: 151 MMHG | BODY MASS INDEX: 33.32 KG/M2 | DIASTOLIC BLOOD PRESSURE: 75 MMHG | HEIGHT: 64 IN

## 2024-11-14 DIAGNOSIS — Z01.818 PREOP TESTING: Primary | ICD-10-CM

## 2024-11-14 LAB
ALBUMIN SERPL BCP-MCNC: 3.6 G/DL (ref 3.5–5.2)
ALP SERPL-CCNC: 105 U/L (ref 40–150)
ALT SERPL W/O P-5'-P-CCNC: 11 U/L (ref 10–44)
ANION GAP SERPL CALC-SCNC: 11 MMOL/L (ref 8–16)
AST SERPL-CCNC: 16 U/L (ref 10–40)
BASOPHILS # BLD AUTO: 0.05 K/UL (ref 0–0.2)
BASOPHILS NFR BLD: 0.6 % (ref 0–1.9)
BILIRUB SERPL-MCNC: 0.4 MG/DL (ref 0.1–1)
BUN SERPL-MCNC: 11 MG/DL (ref 8–23)
CALCIUM SERPL-MCNC: 9.6 MG/DL (ref 8.7–10.5)
CHLORIDE SERPL-SCNC: 102 MMOL/L (ref 95–110)
CO2 SERPL-SCNC: 24 MMOL/L (ref 23–29)
CREAT SERPL-MCNC: 0.9 MG/DL (ref 0.5–1.4)
DIFFERENTIAL METHOD BLD: NORMAL
EOSINOPHIL # BLD AUTO: 0.1 K/UL (ref 0–0.5)
EOSINOPHIL NFR BLD: 0.8 % (ref 0–8)
ERYTHROCYTE [DISTWIDTH] IN BLOOD BY AUTOMATED COUNT: 13.2 % (ref 11.5–14.5)
EST. GFR  (NO RACE VARIABLE): >60 ML/MIN/1.73 M^2
GLUCOSE SERPL-MCNC: 70 MG/DL (ref 70–110)
HCT VFR BLD AUTO: 41.7 % (ref 37–48.5)
HGB BLD-MCNC: 13.9 G/DL (ref 12–16)
IMM GRANULOCYTES # BLD AUTO: 0.02 K/UL (ref 0–0.04)
IMM GRANULOCYTES NFR BLD AUTO: 0.2 % (ref 0–0.5)
LYMPHOCYTES # BLD AUTO: 2 K/UL (ref 1–4.8)
LYMPHOCYTES NFR BLD: 23.1 % (ref 18–48)
MCH RBC QN AUTO: 29.1 PG (ref 27–31)
MCHC RBC AUTO-ENTMCNC: 33.3 G/DL (ref 32–36)
MCV RBC AUTO: 87 FL (ref 82–98)
MONOCYTES # BLD AUTO: 0.4 K/UL (ref 0.3–1)
MONOCYTES NFR BLD: 5 % (ref 4–15)
NEUTROPHILS # BLD AUTO: 6 K/UL (ref 1.8–7.7)
NEUTROPHILS NFR BLD: 70.3 % (ref 38–73)
NRBC BLD-RTO: 0 /100 WBC
OHS QRS DURATION: 72 MS
OHS QTC CALCULATION: 430 MS
PLATELET # BLD AUTO: 374 K/UL (ref 150–450)
PMV BLD AUTO: 9.6 FL (ref 9.2–12.9)
POTASSIUM SERPL-SCNC: 4.3 MMOL/L (ref 3.5–5.1)
PROT SERPL-MCNC: 7.9 G/DL (ref 6–8.4)
RBC # BLD AUTO: 4.77 M/UL (ref 4–5.4)
SODIUM SERPL-SCNC: 137 MMOL/L (ref 136–145)
WBC # BLD AUTO: 8.53 K/UL (ref 3.9–12.7)

## 2024-11-14 PROCEDURE — 93005 ELECTROCARDIOGRAM TRACING: CPT

## 2024-11-14 PROCEDURE — 36415 COLL VENOUS BLD VENIPUNCTURE: CPT | Performed by: SURGERY

## 2024-11-14 PROCEDURE — 85025 COMPLETE CBC W/AUTO DIFF WBC: CPT | Performed by: SURGERY

## 2024-11-14 PROCEDURE — 80053 COMPREHEN METABOLIC PANEL: CPT | Performed by: SURGERY

## 2024-11-14 PROCEDURE — 93010 ELECTROCARDIOGRAM REPORT: CPT | Mod: ,,, | Performed by: INTERNAL MEDICINE

## 2024-11-14 RX ORDER — ISOTRETINOIN 40 MG/1
CAPSULE, GELATIN COATED ORAL
COMMUNITY
Start: 2024-10-28

## 2024-11-14 NOTE — ANESTHESIA PREPROCEDURE EVALUATION
11/14/2024  Dior Godwin is a 64 y.o., female  To undergo Procedure(s) (LRB):  REPAIR, HERNIA, INCISIONAL (N/A)     Denies CP/SOB/MI/CVA/URI symptoms.  Endorses GERD that is well controlled.  METS > 4  NPO > 8    Past Medical History:  Past Medical History:   Diagnosis Date    Arthritis     Hypertension        Past Surgical History:  Past Surgical History:   Procedure Laterality Date    CERVICAL POLYPECTOMY      CHOLECYSTECTOMY      FINGER SURGERY         Social History:  Social History     Socioeconomic History    Marital status:    Tobacco Use    Smoking status: Never    Smokeless tobacco: Never   Substance and Sexual Activity    Alcohol use: Yes     Comment: rarely    Drug use: No    Sexual activity: Yes     Partners: Male     Birth control/protection: Post-menopausal     Social Drivers of Health     Financial Resource Strain: Low Risk  (10/11/2024)    Overall Financial Resource Strain (CARDIA)     Difficulty of Paying Living Expenses: Not very hard   Food Insecurity: No Food Insecurity (10/11/2024)    Hunger Vital Sign     Worried About Running Out of Food in the Last Year: Never true     Ran Out of Food in the Last Year: Never true   Transportation Needs: No Transportation Needs (11/10/2022)    Received from [facility]     Lack of Transportation (Medical): No     Lack of Transportation (Non-Medical): No   Physical Activity: Inactive (10/11/2024)    Exercise Vital Sign     Days of Exercise per Week: 0 days     Minutes of Exercise per Session: 0 min   Stress: Stress Concern Present (10/11/2024)    [employer] [employer] - Occupational Stress Questionnaire     Feeling of Stress : To some extent   Housing Stability: Unknown (10/11/2024)    Housing Stability Vital Sign     Unable to Pay for Housing in the Last Year: No       Medications:  No current  facility-administered medications on file prior to encounter.     Current Outpatient Medications on File Prior to Encounter   Medication Sig Dispense Refill    citalopram (CELEXA) 40 MG tablet Take 40 mg by mouth once daily.       dicyclomine (BENTYL) 20 mg tablet Take 20 mg by mouth every 6 (six) hours as needed.       diltiaZEM (CARDIZEM CD) 300 MG 24 hr capsule Take 300 mg by mouth once daily.      ergocalciferol (ERGOCALCIFEROL) 50,000 unit Cap Take 50,000 Units by mouth every 7 days.      estradioL (ESTRACE) 1 MG tablet Take 1 tablet (1 mg total) by mouth once daily. 90 tablet 1    omeprazole (PRILOSEC) 20 MG capsule Take 2 capsules (40 mg total) by mouth once daily. 30 capsule 0    progesterone (PROMETRIUM) 100 MG capsule TAKE 1 CAPSULE(100 MG) BY MOUTH EVERY NIGHT 30 capsule 3       Allergies:  Review of patient's allergies indicates:  No Known Allergies    Active Problems:  Patient Active Problem List   Diagnosis    Gallstones    Cervicitis and endocervicitis    Enlarged uterus    PCB (post coital bleeding)    Acute vaginitis    Abnormal uterine bleeding    Menopausal state    Chronic vaginitis    Pelvic pain in female    Atrophic vaginitis    Uterine leiomyoma       Diagnostic Studies:   Latest Reference Range & Units 11/14/24 12:12   WBC 3.90 - 12.70 K/uL 8.53   RBC 4.00 - 5.40 M/uL 4.77   Hemoglobin 12.0 - 16.0 g/dL 13.9   Hematocrit 37.0 - 48.5 % 41.7   MCV 82 - 98 fL 87   MCH 27.0 - 31.0 pg 29.1   MCHC 32.0 - 36.0 g/dL 33.3   RDW 11.5 - 14.5 % 13.2   Platelet Count 150 - 450 K/uL 374   MPV 9.2 - 12.9 fL 9.6   Gran % 38.0 - 73.0 % 70.3   Lymph % 18.0 - 48.0 % 23.1   Mono % 4.0 - 15.0 % 5.0   Eos % 0.0 - 8.0 % 0.8   Basophil % 0.0 - 1.9 % 0.6   Immature Granulocytes 0.0 - 0.5 % 0.2   Gran # (ANC) 1.8 - 7.7 K/uL 6.0   Lymph # 1.0 - 4.8 K/uL 2.0   Mono # 0.3 - 1.0 K/uL 0.4   Eos # 0.0 - 0.5 K/uL 0.1   Baso # 0.00 - 0.20 K/uL 0.05   Immature Grans (Abs) 0.00 - 0.04 K/uL 0.02   nRBC 0 /100 WBC 0    Differential Method  Automated      Latest Reference Range & Units 11/14/24 12:12   Sodium 136 - 145 mmol/L 137   Potassium 3.5 - 5.1 mmol/L 4.3   Chloride 95 - 110 mmol/L 102   CO2 23 - 29 mmol/L 24   Anion Gap 8 - 16 mmol/L 11   BUN 8 - 23 mg/dL 11   Creatinine 0.5 - 1.4 mg/dL 0.9   eGFR >60 mL/min/1.73 m^2 >60     EKG (11/14/24):  NSR    24 Hour Vitals:      See Nursing Charting For Additional Vitals    Pre-op Assessment    I have reviewed the Patient Summary Reports.     I have reviewed the Nursing Notes. I have reviewed the NPO Status.   I have reviewed the Medications.     Review of Systems  Anesthesia Hx:  No problems with previous Anesthesia             Denies Family Hx of Anesthesia complications.    Denies Personal Hx of Anesthesia complications.                    Social:  Non-Smoker, Social Alcohol Use       Cardiovascular:  Exercise tolerance: good   Hypertension              ECG has been reviewed.    Functional Capacity good / => 4 METS                         Pulmonary:  Pulmonary Normal                       Hepatic/GI:        Incisional hernia             Musculoskeletal:  Arthritis               Neurological:  Neurology Normal                                      Endocrine:        Obesity / BMI > 30      Physical Exam  General: Well nourished and Cooperative    Airway:  Mallampati: II   Mouth Opening: Normal  TM Distance: Normal    Dental:  Intact    Chest/Lungs:  Clear to auscultation, Normal Respiratory Rate    Heart:  Rate: Normal  Rhythm: Regular Rhythm        Anesthesia Plan  Type of Anesthesia, risks & benefits discussed:    Anesthesia Type: Gen ETT  Intra-op Monitoring Plan: Standard ASA Monitors  Post Op Pain Control Plan: multimodal analgesia and IV/PO Opioids PRN  Induction:  IV  Airway Plan: Direct and Video, Post-Induction  Informed Consent: Informed consent signed with the Patient and all parties understand the risks and agree with anesthesia plan.  All questions answered. Patient  consented to blood products? Yes  ASA Score: 2  Anesthesia Plan Notes:   GA with OETT  Standard ASA monitors  Recovery in PACU  PONV: 3    Ready For Surgery From Anesthesia Perspective.     .

## 2024-11-14 NOTE — DISCHARGE INSTRUCTIONS
YOUR PROCEDURE WILL BE AT [facility] at [address] [place] [address]                 Enter through the Main Entrance facing [address].                 Report to the Same Day Surgery Registration Desk in the hallway.(Just beside the Same Day Surgery Unit)      Your procedure  is scheduled for _11/21/2024_________.    Call [phone] between 2pm and 5pm on __11/20/24_____to find out your arrival time for the day of surgery.    You may have two visitors.  No children under 12 years old.     You will be going to the Same Day Surgery Unit on the 2nd floor of the hospital.    Important instructions:  Do not eat anything after midnight.  You may have plain water, non carbonated.  You may also have Gatorade or Powerade after midnight.    Stop all fluids 2 hours before your surgery.    It is okay to brush your teeth.  Do not have gum, candy or mints.    SEE MEDICATION SHEET.   TAKE MEDICATIONS AS DIRECTED.      Do not take any diabetic medication on the morning of surgery unless instructed to do so by your doctor or pre op nurse.      All GLP-1 weekly diabetic/weight loss medications must not be taken for one week before your surgery, or your surgery could be canceled.      STOP taking for 7 days before surgery:    Aspirin           Voltaren (Diclofenac)  Ibuprofen  (Advil, Motrin)                Indomethocin  Mobic (meloxicam, celebrex)      Etodolac   Aleve (naproxen)          Toradol (ketoralac)  Fish oil, Krill oil and Vitamin E  Headache Powders (BC Powder, Goody's Powder, Stanback)                           You may take Tylenol if needed which is not a blood thinner.    Please shower the night before and the morning of your surgery.      Follow any Prep Instructions given by your surgeon.    Use Chlorhexidine soap as instructed by your pre op nurse.   Please place clean linens on your bed the night before surgery. Please wear fresh clean clothing after each shower.    No  shaving of procedural area at least 4-5 days before surgery due to increased risk of skin irritation and/or possible infection.    Female patients may be asked for a urine specimen on the morning of the surgery.  Please check with your nurse before using the restroom.    Contact lenses and removable denture work may not be worn during your procedure.    You may wear deodorant only. If you are having breast surgery, do not wear deodorant on the operative side.    Do not wear powder, body lotion, perfume/cologne or make-up.    Do not wear any jewelry or have any metal on your body.    You will be asked to remove any dentures or partials for the procedure.    If you are going home on the same day of surgery, you must arrange for a family member or a friend to drive you home.  Public transportation is prohibited.  You will not be able to drive home if you were given anesthesia or sedation.    Patients who want to have their Post-op prescriptions filled from our in-house [facility], bring a Credit/Debit Card or cash with you. A co-pay may be required.  The pharmacy closes at 5:30 pm.    Wear loose fitting clothes allowing for bandages.    Please leave money and valuables home.      You may bring your cell phone.    Call the doctor if fever or illness should occur before your surgery.    Call [phone] to contact us here if needed.                            CLOTHES ON DAY OF SURGERY    SHOULDER surgery:  you must have a very oversized shirt.  Very, Very large.  You will probably have a large sling on with your arm strapped to your chest.  You will not be able to put the arm of the operated shoulder into a sleeve.  You can put the arm of the un-operated shoulder into the sleeve, but the shirt will need to be draped over the operated shoulder.       ARM or HAND surgery:  make sure that your sleeves are large and loose enough to pass over large dressings or cast.      BREAST or UNDERARM surgery:  wear a loose, button  down shirt so that you can dress without raising your arms over your head.    ABDOMINAL surgery:  wear loose, comfortable clothing.  Nothing tight around the abdomen.  NO JEANS    PENIS or SCROTAL surgery:  loose comfortable clothing.  Large sweat pants, pajama pants or a robe.  ABSOLUTELY NO JEANS      LEG or FOOT surgery:  wear large loose pants that are able to pass over any large dressings or casts.  You could also wear loose shorts or a skirt.

## 2024-11-20 ENCOUNTER — TELEPHONE (OUTPATIENT)
Dept: SURGERY | Facility: HOSPITAL | Age: 64
End: 2024-11-20
Payer: COMMERCIAL

## 2024-11-21 ENCOUNTER — ANESTHESIA (OUTPATIENT)
Dept: SURGERY | Facility: HOSPITAL | Age: 64
End: 2024-11-21
Payer: COMMERCIAL

## 2024-11-21 ENCOUNTER — HOSPITAL ENCOUNTER (OUTPATIENT)
Facility: HOSPITAL | Age: 64
Discharge: HOME | End: 2024-11-21
Attending: SURGERY | Admitting: SURGERY
Payer: COMMERCIAL

## 2024-11-21 VITALS
SYSTOLIC BLOOD PRESSURE: 164 MMHG | RESPIRATION RATE: 16 BRPM | TEMPERATURE: 99 F | DIASTOLIC BLOOD PRESSURE: 74 MMHG | BODY MASS INDEX: 33.52 KG/M2 | WEIGHT: 195.31 LBS | OXYGEN SATURATION: 95 % | HEART RATE: 84 BPM

## 2024-11-21 DIAGNOSIS — K43.2 INCISIONAL HERNIA OF ANTERIOR ABDOMINAL WALL WITHOUT OBSTRUCTION OR GANGRENE: ICD-10-CM

## 2024-11-21 DIAGNOSIS — K43.2 INCISIONAL HERNIA, WITHOUT OBSTRUCTION OR GANGRENE: ICD-10-CM

## 2024-11-21 LAB — POCT GLUCOSE: 94 MG/DL (ref 70–110)

## 2024-11-21 PROCEDURE — 37000009 HC ANESTHESIA EA ADD 15 MINS: Performed by: SURGERY

## 2024-11-21 PROCEDURE — 49591 RPR AA HRN 1ST < 3 CM RDC: CPT | Mod: ,,, | Performed by: SURGERY

## 2024-11-21 PROCEDURE — 25000003 PHARM REV CODE 250: Performed by: ANESTHESIOLOGY

## 2024-11-21 PROCEDURE — 63600175 PHARM REV CODE 636 W HCPCS: Performed by: STUDENT IN AN ORGANIZED HEALTH CARE EDUCATION/TRAINING PROGRAM

## 2024-11-21 PROCEDURE — 25000003 PHARM REV CODE 250: Performed by: STUDENT IN AN ORGANIZED HEALTH CARE EDUCATION/TRAINING PROGRAM

## 2024-11-21 PROCEDURE — 37000008 HC ANESTHESIA 1ST 15 MINUTES: Performed by: SURGERY

## 2024-11-21 PROCEDURE — 25000003 PHARM REV CODE 250: Performed by: SURGERY

## 2024-11-21 PROCEDURE — 71000016 HC POSTOP RECOV ADDL HR: Performed by: SURGERY

## 2024-11-21 PROCEDURE — 36000706: Performed by: SURGERY

## 2024-11-21 PROCEDURE — 36000707: Performed by: SURGERY

## 2024-11-21 PROCEDURE — 63600175 PHARM REV CODE 636 W HCPCS: Mod: JZ,JG | Performed by: SURGERY

## 2024-11-21 PROCEDURE — 82962 GLUCOSE BLOOD TEST: CPT | Performed by: SURGERY

## 2024-11-21 PROCEDURE — 71000039 HC RECOVERY, EACH ADD'L HOUR: Performed by: SURGERY

## 2024-11-21 PROCEDURE — 63600175 PHARM REV CODE 636 W HCPCS: Performed by: ANESTHESIOLOGY

## 2024-11-21 PROCEDURE — 71000033 HC RECOVERY, INTIAL HOUR: Performed by: SURGERY

## 2024-11-21 PROCEDURE — 71000015 HC POSTOP RECOV 1ST HR: Performed by: SURGERY

## 2024-11-21 PROCEDURE — 63600175 PHARM REV CODE 636 W HCPCS: Performed by: SURGERY

## 2024-11-21 RX ORDER — AMOXICILLIN 500 MG
1 CAPSULE ORAL DAILY
COMMUNITY

## 2024-11-21 RX ORDER — MIDAZOLAM HYDROCHLORIDE 1 MG/ML
INJECTION INTRAMUSCULAR; INTRAVENOUS
Status: DISCONTINUED | OUTPATIENT
Start: 2024-11-21 | End: 2024-11-21

## 2024-11-21 RX ORDER — OXYCODONE AND ACETAMINOPHEN 5; 325 MG/1; MG/1
1 TABLET ORAL ONCE
Status: COMPLETED | OUTPATIENT
Start: 2024-11-21 | End: 2024-11-21

## 2024-11-21 RX ORDER — HALOPERIDOL 5 MG/ML
0.5 INJECTION INTRAMUSCULAR EVERY 10 MIN PRN
Status: DISCONTINUED | OUTPATIENT
Start: 2024-11-21 | End: 2024-11-21 | Stop reason: HOSPADM

## 2024-11-21 RX ORDER — ROCURONIUM BROMIDE 10 MG/ML
INJECTION, SOLUTION INTRAVENOUS
Status: DISCONTINUED | OUTPATIENT
Start: 2024-11-21 | End: 2024-11-21

## 2024-11-21 RX ORDER — PROPOFOL 10 MG/ML
VIAL (ML) INTRAVENOUS
Status: DISCONTINUED | OUTPATIENT
Start: 2024-11-21 | End: 2024-11-21

## 2024-11-21 RX ORDER — LIDOCAINE HYDROCHLORIDE 10 MG/ML
1 INJECTION, SOLUTION EPIDURAL; INFILTRATION; INTRACAUDAL; PERINEURAL ONCE
Status: DISCONTINUED | OUTPATIENT
Start: 2024-11-21 | End: 2024-11-21 | Stop reason: HOSPADM

## 2024-11-21 RX ORDER — ONDANSETRON HYDROCHLORIDE 2 MG/ML
4 INJECTION, SOLUTION INTRAVENOUS DAILY PRN
Status: DISCONTINUED | OUTPATIENT
Start: 2024-11-21 | End: 2024-11-21 | Stop reason: HOSPADM

## 2024-11-21 RX ORDER — SCOLOPAMINE TRANSDERMAL SYSTEM 1 MG/1
PATCH, EXTENDED RELEASE TRANSDERMAL
Status: DISCONTINUED | OUTPATIENT
Start: 2024-11-21 | End: 2024-11-21

## 2024-11-21 RX ORDER — ONDANSETRON HYDROCHLORIDE 2 MG/ML
INJECTION, SOLUTION INTRAVENOUS
Status: DISCONTINUED | OUTPATIENT
Start: 2024-11-21 | End: 2024-11-21

## 2024-11-21 RX ORDER — HYDROMORPHONE HYDROCHLORIDE 2 MG/ML
0.2 INJECTION, SOLUTION INTRAMUSCULAR; INTRAVENOUS; SUBCUTANEOUS EVERY 5 MIN PRN
Status: DISCONTINUED | OUTPATIENT
Start: 2024-11-21 | End: 2024-11-21 | Stop reason: HOSPADM

## 2024-11-21 RX ORDER — BUPIVACAINE HYDROCHLORIDE 2.5 MG/ML
INJECTION, SOLUTION EPIDURAL; INFILTRATION; INTRACAUDAL
Status: DISCONTINUED | OUTPATIENT
Start: 2024-11-21 | End: 2024-11-21 | Stop reason: HOSPADM

## 2024-11-21 RX ORDER — OXYCODONE AND ACETAMINOPHEN 5; 325 MG/1; MG/1
1 TABLET ORAL EVERY 6 HOURS PRN
Qty: 20 TABLET | Refills: 0 | Status: SHIPPED | OUTPATIENT
Start: 2024-11-21

## 2024-11-21 RX ORDER — ACETAMINOPHEN 500 MG
1000 TABLET ORAL
Status: COMPLETED | OUTPATIENT
Start: 2024-11-21 | End: 2024-11-21

## 2024-11-21 RX ORDER — LIDOCAINE HYDROCHLORIDE 20 MG/ML
INJECTION INTRAVENOUS
Status: DISCONTINUED | OUTPATIENT
Start: 2024-11-21 | End: 2024-11-21

## 2024-11-21 RX ORDER — SODIUM CHLORIDE 0.9 % (FLUSH) 0.9 %
10 SYRINGE (ML) INJECTION
Status: DISCONTINUED | OUTPATIENT
Start: 2024-11-21 | End: 2024-11-21 | Stop reason: HOSPADM

## 2024-11-21 RX ORDER — GLUCAGON 1 MG
1 KIT INJECTION
Status: DISCONTINUED | OUTPATIENT
Start: 2024-11-21 | End: 2024-11-21 | Stop reason: HOSPADM

## 2024-11-21 RX ORDER — SODIUM CHLORIDE 9 MG/ML
INJECTION, SOLUTION INTRAVENOUS CONTINUOUS
Status: DISCONTINUED | OUTPATIENT
Start: 2024-11-21 | End: 2024-11-21 | Stop reason: HOSPADM

## 2024-11-21 RX ORDER — CEFAZOLIN 2 G/1
2 INJECTION, POWDER, FOR SOLUTION INTRAMUSCULAR; INTRAVENOUS
Status: COMPLETED | OUTPATIENT
Start: 2024-11-21 | End: 2024-11-21

## 2024-11-21 RX ORDER — DEXAMETHASONE SODIUM PHOSPHATE 4 MG/ML
INJECTION, SOLUTION INTRA-ARTICULAR; INTRALESIONAL; INTRAMUSCULAR; INTRAVENOUS; SOFT TISSUE
Status: DISCONTINUED | OUTPATIENT
Start: 2024-11-21 | End: 2024-11-21

## 2024-11-21 RX ORDER — KETOROLAC TROMETHAMINE 30 MG/ML
INJECTION, SOLUTION INTRAMUSCULAR; INTRAVENOUS
Status: DISCONTINUED | OUTPATIENT
Start: 2024-11-21 | End: 2024-11-21

## 2024-11-21 RX ORDER — FENTANYL CITRATE 50 UG/ML
INJECTION, SOLUTION INTRAMUSCULAR; INTRAVENOUS
Status: DISCONTINUED | OUTPATIENT
Start: 2024-11-21 | End: 2024-11-21

## 2024-11-21 RX ORDER — SODIUM CHLORIDE, SODIUM LACTATE, POTASSIUM CHLORIDE, CALCIUM CHLORIDE 600; 310; 30; 20 MG/100ML; MG/100ML; MG/100ML; MG/100ML
INJECTION, SOLUTION INTRAVENOUS CONTINUOUS
Status: DISCONTINUED | OUTPATIENT
Start: 2024-11-21 | End: 2024-11-21 | Stop reason: HOSPADM

## 2024-11-21 RX ADMIN — LIDOCAINE HYDROCHLORIDE 100 MG: 20 INJECTION, SOLUTION INTRAVENOUS at 12:11

## 2024-11-21 RX ADMIN — DEXAMETHASONE SODIUM PHOSPHATE 4 MG: 4 INJECTION, SOLUTION INTRAMUSCULAR; INTRAVENOUS at 01:11

## 2024-11-21 RX ADMIN — ROCURONIUM BROMIDE 50 MG: 10 INJECTION INTRAVENOUS at 12:11

## 2024-11-21 RX ADMIN — SUGAMMADEX 200 MG: 100 INJECTION, SOLUTION INTRAVENOUS at 01:11

## 2024-11-21 RX ADMIN — PROPOFOL 150 MG: 10 INJECTION, EMULSION INTRAVENOUS at 12:11

## 2024-11-21 RX ADMIN — ONDANSETRON 4 MG: 2 INJECTION, SOLUTION INTRAMUSCULAR; INTRAVENOUS at 01:11

## 2024-11-21 RX ADMIN — SCOPALAMINE 1 PATCH: 1 PATCH, EXTENDED RELEASE TRANSDERMAL at 12:11

## 2024-11-21 RX ADMIN — CEFAZOLIN 2 G: 2 INJECTION, POWDER, FOR SOLUTION INTRAMUSCULAR; INTRAVENOUS at 01:11

## 2024-11-21 RX ADMIN — SODIUM CHLORIDE: 0.9 INJECTION, SOLUTION INTRAVENOUS at 12:11

## 2024-11-21 RX ADMIN — KETOROLAC TROMETHAMINE 30 MG: 30 INJECTION, SOLUTION INTRAMUSCULAR at 01:11

## 2024-11-21 RX ADMIN — HYDROMORPHONE HYDROCHLORIDE 0.2 MG: 2 INJECTION, SOLUTION INTRAMUSCULAR; INTRAVENOUS; SUBCUTANEOUS at 01:11

## 2024-11-21 RX ADMIN — HYDROMORPHONE HYDROCHLORIDE 0.2 MG: 2 INJECTION, SOLUTION INTRAMUSCULAR; INTRAVENOUS; SUBCUTANEOUS at 02:11

## 2024-11-21 RX ADMIN — LIDOCAINE HYDROCHLORIDE 100 MG: 20 INJECTION, SOLUTION INTRAVENOUS at 01:11

## 2024-11-21 RX ADMIN — OXYCODONE HYDROCHLORIDE AND ACETAMINOPHEN 1 TABLET: 5; 325 TABLET ORAL at 03:11

## 2024-11-21 RX ADMIN — FENTANYL CITRATE 100 MCG: 50 INJECTION, SOLUTION INTRAMUSCULAR; INTRAVENOUS at 12:11

## 2024-11-21 RX ADMIN — ACETAMINOPHEN 1000 MG: 500 TABLET ORAL at 11:11

## 2024-11-21 RX ADMIN — MIDAZOLAM HYDROCHLORIDE 2 MG: 1 INJECTION INTRAMUSCULAR; INTRAVENOUS at 12:11

## 2024-11-21 NOTE — DISCHARGE INSTRUCTIONS
Patient Instructions:  Activity:  - No heavy lifting more than 10 lbs for 6 weeks  - Do not drive or operate heavy machinery while taking narcotic pain medications.    Wound Care:  - Remove island dressing in 48 hours. Leave steri-strips in place.  - Ok to shower in 48 hours.  - Allow soap and water to run over incisions.  - Do not scrub incisions.  - Pat incisions dry.  - Do not submerge incisions in water.  - Can redress incisions with bandages as needed.    Follow up:  - Please follow up with Dr. Ny in 2 weeks.      Haja Ny and Geoffrey  Office # [phone]    Discharge Instructions for Same Day Surgery     Call the office for and appointment if one has not already been made.     Diet: Drink plenty of fluids the first 48 hours and you may resume your   usual diet.     Activity: No heavy lifting (over 10 pounds), pushing or pulling until your   post op visit. Your doctor's office may have told you to limit your lifting to less weight, or even no weight.  Be sure to follow those instructions.    Note: You may ride in a car and you may drive when comfortable.     Do not drive, drink alcohol, or sign legal documents for 24 hours, or if taking narcotic pain medication.    Dressings: Remove the dressing 48hours after surgery. You may shower  48 hours after surgery and you may wash your hair.     If you have steri strips ( appears to be strips of white tape) on   your incision, leave them on. In 5-7 days they will begin to fall off.    Medical: Call the doctor for any of the following problems: fever above 101,   severe pain, bleeding, or abdominal distention (swelling).   If constipated you may take any stool softener you choose.     Occasionally small areas of skin numbness or an unpleasant skin sensation can result. Also, you may find that your incision is swollen and tender for a few days.  Some redness around sutures and staples is a normal reaction, but if the discomfort persists or worsens, call you  doctor.     Fall Prevention  Millions of people fall every year and injure themselves. You may have had anesthesia or sedation which may increase your risk of falling. You may have health issues that put you at an increased risk of falling.     Here are ways to reduce your risk of falling.    Make your home safe by keeping walkways clear of objects you may trip over.  Use non-slip pads under rugs. Do not use area rugs or small throw rugs.  Use non-slip mats in bathtubs and showers.  Install handrails and lights on staircases.  Do not walk in poorly lit areas.  Do not stand on chairs or wobbly ladders.  Use caution when reaching overhead or looking upward. This position can cause a loss of balance.  Be sure your shoes fit properly, have non-slip bottoms and are in good condition.   Wear shoes both inside and out. Avoid going barefoot or wearing slippers.  Be cautious when going up and down stairs, curbs, and when walking on uneven sidewalks.  If your balance is poor, consider using a cane or walker.  If your fall was related to alcohol use, stop or limit alcohol intake.   If your fall was related to use of sleeping medicines, talk to your doctor about this. You may need to reduce your dosage at bedtime if you awaken during the night to go to the bathroom.    To reduce the need for nighttime bathroom trips:  Avoid drinking fluids for several hours before going to bed  Empty your bladder before going to bed  Men can keep a urinal at the bedside  Stay as active as you can. Balance, flexibility, strength, and endurance all come from exercise. They all play a role in preventing falls. Ask your healthcare provider which types of activity are right for you.  Get your vision checked on a regular basis.  If you have pets, know where they are before you stand up or walk so you don't trip over them.  Use night lights.    Scopolamine (skoe JACKELINE a cyrus) Skin Patch Discharge Instructions    What is this drug used for?   It is used  to help motion sickness.  It is used to treat GI (gastrointestinal) spasms.  It is used to prevent upset stomach and throwing up from surgery.  It is used during surgery.    What are some side effects of this drug?*  All drugs may cause side effects. However, many people have no side effects or only have minor side effects. Call your doctor or get medical help if any of these side effects or any other side effects bother you or do not go away:    Dry mouth.  Feeling dizzy or sleepy.  Diarrhea.  Upset stomach.  Sore throat.  Restlessness.  Irritation where this drug is used(SKIN PATCH).    Skin patch:     Do not take this drug by mouth. Use on your skin only. Keep out of your mouth and eyes (may burn).  *The patch may have metal. Take off the patch before an MRI.  Wash your hands before and after use.  Wear only one patch at a time.  Be careful to not knock loose the patch while bathing/showering.  When patch is taken off, wash site with soap and water.  After you take off a skin patch, be sure to fold the sticky sides of the patch to each other. Throw away used patches where children and pets cannot get to them.    Removing Skin Patch:    Remove the disc after 3 days, or sooner if you no longer need it or are experiencing side effects. Remove patch with gloves or tissue paper to avoid direct contact with hands. After removal wash area behind ear with soap and water. Fold disc in half and throw in trash that is away from children and pets to avoid accidental contact or ingestion.     *These are not all of the side effects that may occur. If you have questions about side effects, call your doctor. Call your doctor for medical advice about side effects.  You may report side effects to your national health agency.  You may report side effects to the FDA at 1-839.950.3866. You may also report side effects at https://www.fda.gov/medwatch.

## 2024-11-21 NOTE — DISCHARGE SUMMARY
[facility] [place] - Surgery  Discharge Note  Short Stay    Procedure(s) (LRB):  REPAIR, HERNIA, INCISIONAL (N/A)      OUTCOME: Patient tolerated treatment/procedure well without complication and is now ready for discharge.    DISPOSITION: Home or Self Care    FINAL DIAGNOSIS:  Incisional hernia    FOLLOWUP: In clinic in 2 weeks    DISCHARGE INSTRUCTIONS:    Discharge Procedure Orders   Ambulatory referral/consult to General Surgery   Standing Status: Future   Referral Priority: Routine Referral Type: Consultation   Referral Reason: Specialty Services Required   Referred to Provider: STAN PITTMAN Requested Specialty: General Surgery   Number of Visits Requested: 1     Diet Adult Regular     Lifting restrictions   Order Comments: Maximum 10 lb for 6 weeks     No driving until:   Order Comments: No longer taking narcotic pain medications     Notify your health care provider if you experience any of the following:  temperature >100.4     Notify your health care provider if you experience any of the following:  persistent nausea and vomiting or diarrhea     Notify your health care provider if you experience any of the following:  severe uncontrolled pain     Notify your health care provider if you experience any of the following:  redness, tenderness, or signs of infection (pain, swelling, redness, odor or green/yellow discharge around incision site)     Notify your health care provider if you experience any of the following:  difficulty breathing or increased cough     Notify your health care provider if you experience any of the following:  worsening rash     Remove dressing in 48 hours   Order Comments: Remove island dressing in 48 hours. Leave steri-strips in place     Shower on day dressing removed (No bath)   Order Comments: In 48 hours        TIME SPENT ON DISCHARGE: 20    Jd Garcia MD  [facility] General Surgery PGY-II  11/21/2024 1:30 PM

## 2024-11-21 NOTE — INTERVAL H&P NOTE
The patient has been examined and the H&P has been reviewed:    I concur with the findings and no changes have occurred since H&P was written.    Surgery risks, benefits and alternative options discussed and understood by patient/family.    Interval H&P     Mr. Dior Godwin is a 64 y.o. female with a Hx of a laparoscopic surgery now with incisional hernia. Patient seen and evaluated in preop. No major surgeries, hospitalizations or new medications since last visit with Dr. Ny, 10/22. NKDA. NPO since midnight. Does not take blood thinners. No fever, chills, cough, congestion, SOB, CP, palpitations, abdominal pain, nausea, vomiting, lightheadedness or dizziness. Physical exam unchanged. Consents reviewed, all questions answered. Patient stable and ready to proceed to OR for robotic incisional hernia repair.     Jd Garcia MD  [facility] General Surgery PGY-II  11/21/2024 12:39 PM      There are no hospital problems to display for this patient.

## 2024-11-21 NOTE — TRANSFER OF CARE
Anesthesia Transfer of Care Note    Patient: Diro Godwin    Procedure(s) Performed: Procedure(s) (LRB):  REPAIR, HERNIA, INCISIONAL (N/A)    Patient location: PACU    Anesthesia Type: general    Transport from OR: Transported from OR on 6-10 L/min O2 by face mask with adequate spontaneous ventilation    Post pain: adequate analgesia    Post assessment: no apparent anesthetic complications    Post vital signs: stable    Level of consciousness: responds to stimulation and sedated    Nausea/Vomiting: no nausea/vomiting    Complications: none    Transfer of care protocol was followed      Last vitals: Visit Vitals  BP (!) 157/67 (BP Location: Right arm, Patient Position: Lying)   Pulse 81   Temp 36.8 °C (98.2 °F) (Temporal)   Resp 20   Wt 88.6 kg (195 lb 4.8 oz)   SpO2 97%   Breastfeeding No   BMI 33.52 kg/m²

## 2024-11-21 NOTE — BRIEF OP NOTE
[facility]  General Surgery  Brief Operative Note    SUMMARY     Date of Procedure: 11/21/2024     Procedure: Procedure(s) (LRB):  REPAIR, HERNIA, INCISIONAL (N/A)       Surgeons and Role:     * Aijt Ny MD - Primary     * Jd Garcia MD - Resident - Assisting    Pre-Operative Diagnosis: Incisional hernia, without obstruction or gangrene [K43.2]    Post-Operative Diagnosis: Post-Op Diagnosis Codes:     * Incisional hernia, without obstruction or gangrene [K43.2]    Anesthesia: General    Indications for Procedure: reducible incisional hernia    Procedure in Detail: see full operative dictation    Significant Surgical Tasks Conducted by the Assistant(s), if Applicable: none    Estimated Blood Loss (EBL): minimal           Implants: * No implants in log *    Specimens:   Specimen (24h ago, onward)      None                    Condition: Stable    Disposition: PACU - hemodynamically stable.    Attestation: Dr. Ny was present and scrubbed for the entire procedure.    Jd Garcia MD  [facility] Surgery PGY-II  11/21/2024 1:32 PM

## 2024-11-21 NOTE — ANESTHESIA PROCEDURE NOTES
Intubation    Date/Time: 11/21/2024 12:57 PM    Performed by: Rashi Alcantar CRNA  Authorized by: Víctor Curran II, MD    Intubation:     Induction:  Intravenous    Intubated:  Postinduction    Mask Ventilation:  Easy with oral airway    Attempts:  1    Attempted By:  MARIVEL and student (IRAM Vicente)    Method of Intubation:  Video laryngoscopy    Blade:  Hoffman 3    Laryngeal View Grade: Grade I - full view of cords      Difficult Airway Encountered?: No      Complications:  None    Airway Device:  Oral endotracheal tube    Airway Device Size:  7.0    Style/Cuff Inflation:  Cuffed (inflated to minimal occlusive pressure)    Tube secured:  22    Secured at:  The lips    Placement Verified By:  Capnometry and Revisualization with laryngoscopy    Complicating Factors:  None    Findings Post-Intubation:  BS equal bilateral and atraumatic/condition of teeth unchanged

## 2024-11-22 NOTE — ANESTHESIA POSTPROCEDURE EVALUATION
Anesthesia Post Evaluation    Patient: Dior Godwin    Procedure(s) Performed: Procedure(s) (LRB):  REPAIR, HERNIA, INCISIONAL (N/A)    Final Anesthesia Type: general      Patient location during evaluation: PACU  Patient participation: Yes- Able to Participate  Level of consciousness: awake and alert  Post-procedure vital signs: reviewed and stable  Pain management: adequate  Airway patency: patent    PONV status at discharge: No PONV  Anesthetic complications: no      Respiratory status: spontaneous ventilation and room air  Hydration status: euvolemic  Follow-up not needed.              Vitals Value Taken Time   /74 11/21/24 1641   Temp 37.1 °C (98.8 °F) 11/21/24 1641   Pulse 84 11/21/24 1641   Resp 16 11/21/24 1641   SpO2 95 % 11/21/24 1641         Event Time   Out of Recovery 15:32:00         Pain/Richy Score: Pain Rating Prior to Med Admin: 6 (11/21/2024  3:58 PM)  Pain Rating Post Med Admin: 4 (11/21/2024  4:41 PM)  Richy Score: 10 (11/21/2024  4:41 PM)  Modified Richy Score: 19 (11/21/2024  4:41 PM)

## 2024-11-22 NOTE — OP NOTE
[facility] - Surgery  General Surgery  Operative Note    SUMMARY     Date of Procedure: 11/21/2024     Procedure: Procedure(s) (LRB):  REPAIR, HERNIA, INCISIONAL (N/A)       Surgeons and Role:     * Ajit Ny MD - Primary     * Jd Garcia MD - Resident - Assisting        Pre-Operative Diagnosis: Incisional hernia, without obstruction or gangrene [K43.2]    Post-Operative Diagnosis: Post-Op Diagnosis Codes:     * Incisional hernia, without obstruction or gangrene [K43.2]    Anesthesia: General    Indications for Procedure:  64-year-old woman with a history of recurrent incisional hernia    Procedure in Detail:  After informed consent was standard patient stent operating laid spine on the operating table after anesthesia was administered, she was prepped and draped in standard surgical fashion.  Incision was made directly over the hernia and carried down to the fascial edges the hernia edge was a proximally 1 cm in size defect.  A 0 Vicryl figure-of-eight stitch was used to reapproximate the edges came together without any tension.  Hemostasis was evident.  Marcaine was injected into the operative field.  Skin edges reapproximated using 3-0 Vicryl deep dermal stitches followed by 4-0 Vicryl subcuticular stitch.    Significant Surgical Tasks Conducted by the Assistant(s), if Applicable: skin closure    Estimated Blood Loss (EBL):  Minimal           Implants: * No implants in log *    Specimens:   Specimen (24h ago, onward)      None                    Condition: Stable    Disposition: PACU - hemodynamically stable.    Attestation: I was present and scrubbed for the entire procedure.

## 2024-12-04 ENCOUNTER — OFFICE VISIT (OUTPATIENT)
Dept: SURGERY | Facility: CLINIC | Age: 64
End: 2024-12-04
Payer: COMMERCIAL

## 2024-12-04 VITALS
HEIGHT: 64 IN | BODY MASS INDEX: 33.34 KG/M2 | WEIGHT: 195.31 LBS | SYSTOLIC BLOOD PRESSURE: 152 MMHG | DIASTOLIC BLOOD PRESSURE: 83 MMHG | HEART RATE: 90 BPM

## 2024-12-04 DIAGNOSIS — K43.2 INCISIONAL HERNIA OF ANTERIOR ABDOMINAL WALL WITHOUT OBSTRUCTION OR GANGRENE: ICD-10-CM

## 2024-12-04 PROCEDURE — 99999 PR PBB SHADOW E&M-EST. PATIENT-LVL III: CPT | Mod: PBBFAC,,, | Performed by: SURGERY

## 2024-12-04 PROCEDURE — 99212 OFFICE O/P EST SF 10 MIN: CPT | Mod: S$GLB,,, | Performed by: SURGERY

## 2024-12-04 PROCEDURE — 1159F MED LIST DOCD IN RCRD: CPT | Mod: CPTII,S$GLB,, | Performed by: SURGERY

## 2024-12-04 PROCEDURE — 3077F SYST BP >= 140 MM HG: CPT | Mod: CPTII,S$GLB,, | Performed by: SURGERY

## 2024-12-04 PROCEDURE — 3008F BODY MASS INDEX DOCD: CPT | Mod: CPTII,S$GLB,, | Performed by: SURGERY

## 2024-12-04 PROCEDURE — 3079F DIAST BP 80-89 MM HG: CPT | Mod: CPTII,S$GLB,, | Performed by: SURGERY

## 2024-12-04 NOTE — PROGRESS NOTES
63 y/o woman with history of incisional hernia repair, now about 2 weeks out.  No complaints this am, reports feeling well.    PE: incision c/d/i no evidence of infection or hernia    Impression: post op, doing well    Plan: gradually resume normal activity, normal diet, and follow up as needed.

## 2025-01-22 NOTE — TELEPHONE ENCOUNTER
Refill Routing Note   Medication(s) are not appropriate for processing by [facility] for the following reason(s):        Patient not seen by provider within 15 months  Required vitals abnormal  Required labs outdated    [facility] action(s):  Defer        Medication Therapy Plan: MMG OUTDATED      Appointments  past 12m or future 3m with PCP    Date Provider   Last Visit   1/24/2022 Shannan Pugh MD   Next Visit   Visit date not found Shannan Pugh MD   ED visits in past 90 days: 0        Note composed:7:43 AM 01/22/2025

## 2025-01-23 RX ORDER — ESTRADIOL 1 MG/1
1 TABLET ORAL
Qty: 90 TABLET | Refills: 0 | Status: SHIPPED | OUTPATIENT
Start: 2025-01-23

## 2025-02-21 NOTE — TELEPHONE ENCOUNTER
Refill Routing Note   Medication(s) are not appropriate for processing by [facility] for the following reason(s):        Patient not seen by provider within 15 months    [facility] action(s):  Defer               Appointments  past 12m or future 3m with PCP    Date Provider   Last Visit   1/24/2022 Shannan Pugh MD   Next Visit   4/8/2025 Shannan Pugh MD   ED visits in past 90 days: 0        Note composed:5:46 AM 02/21/2025

## 2025-02-24 RX ORDER — PROGESTERONE 100 MG/1
CAPSULE ORAL
Qty: 30 CAPSULE | Refills: 2 | Status: SHIPPED | OUTPATIENT
Start: 2025-02-24

## 2025-04-08 ENCOUNTER — OFFICE VISIT (OUTPATIENT)
Dept: OBSTETRICS AND GYNECOLOGY | Facility: CLINIC | Age: 65
End: 2025-04-08
Attending: STUDENT IN AN ORGANIZED HEALTH CARE EDUCATION/TRAINING PROGRAM
Payer: COMMERCIAL

## 2025-04-08 VITALS
HEIGHT: 64 IN | WEIGHT: 197.44 LBS | DIASTOLIC BLOOD PRESSURE: 80 MMHG | BODY MASS INDEX: 33.71 KG/M2 | SYSTOLIC BLOOD PRESSURE: 162 MMHG

## 2025-04-08 DIAGNOSIS — Z12.31 ENCOUNTER FOR SCREENING MAMMOGRAM FOR BREAST CANCER: ICD-10-CM

## 2025-04-08 DIAGNOSIS — Z13.820 ENCOUNTER FOR SCREENING FOR OSTEOPOROSIS: Primary | ICD-10-CM

## 2025-04-08 DIAGNOSIS — Z01.419 WELL WOMAN EXAM: ICD-10-CM

## 2025-04-08 PROCEDURE — 99999 PR PBB SHADOW E&M-EST. PATIENT-LVL III: CPT | Mod: PBBFAC,,, | Performed by: STUDENT IN AN ORGANIZED HEALTH CARE EDUCATION/TRAINING PROGRAM

## 2025-04-08 PROCEDURE — 99396 PREV VISIT EST AGE 40-64: CPT | Mod: S$GLB,,, | Performed by: STUDENT IN AN ORGANIZED HEALTH CARE EDUCATION/TRAINING PROGRAM

## 2025-04-08 PROCEDURE — 3077F SYST BP >= 140 MM HG: CPT | Mod: CPTII,S$GLB,, | Performed by: STUDENT IN AN ORGANIZED HEALTH CARE EDUCATION/TRAINING PROGRAM

## 2025-04-08 PROCEDURE — 3008F BODY MASS INDEX DOCD: CPT | Mod: CPTII,S$GLB,, | Performed by: STUDENT IN AN ORGANIZED HEALTH CARE EDUCATION/TRAINING PROGRAM

## 2025-04-08 PROCEDURE — 1159F MED LIST DOCD IN RCRD: CPT | Mod: CPTII,S$GLB,, | Performed by: STUDENT IN AN ORGANIZED HEALTH CARE EDUCATION/TRAINING PROGRAM

## 2025-04-08 PROCEDURE — 3079F DIAST BP 80-89 MM HG: CPT | Mod: CPTII,S$GLB,, | Performed by: STUDENT IN AN ORGANIZED HEALTH CARE EDUCATION/TRAINING PROGRAM

## 2025-04-08 RX ORDER — CITALOPRAM 20 MG/1
1 TABLET, FILM COATED ORAL DAILY
COMMUNITY
Start: 2024-08-01

## 2025-04-08 NOTE — PROGRESS NOTES
Chief Complaint: Well Woman Exam     HPI:      Dior Godwin is a 65 y.o.  who presents today for well woman exam.  LMP: No LMP recorded. Patient is postmenopausal.  No issues, problems, or complaints. Specifically, patient denies vaginal bleeding, discharge, pelvic pain, urinary problems, or changes in appetite.   She denies hot flushes, vaginal atrophy, mood changes.  Doing well on hrt    Previous Pap: 3/24/2023 no abnormalities, hpv neg  Previous Mammogram: needs  Most Recent Dexa: needs  Colonoscopy: utd per pt    OB History          1    Para   1    Term           1    AB        Living   1         SAB        IAB        Ectopic        Multiple        Live Births   1           Obstetric Comments   Menarche age 14             Past Medical History:   Diagnosis Date    Arthritis     Hypertension      Past Surgical History:   Procedure Laterality Date    CERVICAL POLYPECTOMY      CHOLECYSTECTOMY      FINGER SURGERY      REPAIR, HERNIA, INCISIONAL N/A 2024    Procedure: REPAIR, HERNIA, INCISIONAL;  Surgeon: Ajit Ny MD;  Location: [facility];  Service: General;  Laterality: N/A;  RN PREOP 2024---H/P INCOMPLETE     Social History[1]  Family History   Problem Relation Name Age of Onset    Hypertension Father      Heart attack Father      Hypertension Mother      Hypertension Brother      Hypertension Brother      Breast cancer Neg Hx      Colon cancer Neg Hx      Ovarian cancer Neg Hx       Current Medications[2]    ROS:     GENERAL: Denies unintentional weight gain or weight loss. Feeling well overall.   SKIN: Denies rash or lesions.   HEENT: Denies headaches, or vision changes.   CARDIOVASCULAR: Denies palpitations or chest pain.   RESPIRATORY: Denies shortness of breath or dyspnea on exertion.  BREASTS: Denies pain, lumps, or nipple discharge.   ABDOMEN: Denies abdominal pain, constipation, diarrhea, nausea, vomiting, change in appetite.  URINARY: Denies frequency, dysuria,  "hematuria.  NEUROLOGIC: Denies syncope or weakness.   PSYCHIATRIC: Denies depression, anxiety or mood swings.    Physical Exam:      PHYSICAL EXAM:  BP (!) 162/80   Ht 5' 4" (1.626 m)   Wt 89.5 kg (197 lb 6.8 oz)   BMI 33.89 kg/m²   Body mass index is 33.89 kg/m².     APPEARANCE: Well nourished, well developed, in no acute distress.  PSYCH: Appropriate mood and affect.  SKIN: No acne or hirsutism.  NECK: Neck symmetric without masses or thyromegaly.  NODES: No inguinal, axillary, or supraclavicular lymph node enlargement.  CHEST: Normal respiratory effort.    BREASTS: Symmetrical, no skin changes or visible lesions.  No palpable masses or nipple discharge bilaterally.  ABDOMEN: Soft.  No tenderness or masses.    PELVIC: Normal external genitalia without lesions.  Normal hair distribution.  Adequate perineal body, normal urethral meatus.  Vagina moist and well rugated without lesions or discharge.  Cervix pink, without lesions, discharge or tenderness.  No significant cystocele or rectocele.  Bimanual exam shows uterus to be normal size, regular, mobile and nontender.  Adnexa without masses or tenderness.    EXTREMITIES: No edema.  No tenderness to palpation.  Female chaperone was present for exam.       Assessment/Plan:     65 y.o.     Encounter for screening for osteoporosis  -     Cancel: DXA Bone Density Axial Skeleton 1 or more sites; Future; Expected date: 2025  -     DXA Bone Density Axial Skeleton 1 or more sites; Future; Expected date: 2025    Encounter for screening mammogram for breast cancer  -     Cancel: Mammo Digital Diagnostic Bilat; Future; Expected date: 2025  -     Mammo Digital Diagnostic Bilat; Future; Expected date: 2025    Well woman exam    Other orders  -     estradioL (ESTRACE) 1 MG tablet; Take 1 tablet (1 mg total) by mouth once daily.  Dispense: 90 tablet; Refill: 3  -     progesterone (PROMETRIUM) 100 MG capsule; Take 1 capsule (100 mg total) by mouth " once daily.  Dispense: 30 capsule; Refill: 11          Counselin.  Annual exam performed today without difficulty.  Patient was counseled today on current ASCCP pap guidelines, the recommendation for yearly pelvic exams, healthy diet and exercise routines, annual mammograms.  Mammogram ordered.  Pap smear utd.  All questions answered.  Desires to continue HRT< refills sent.  R/b/a reviewed. A ll questions answered.  Dexa also ordered  2.  Follow up with PCP for other health maintenance.  3.  Follow up in about 1 year (around 2026).     Use of the [facility] Patient Portal discussed and encouraged during today's visit.                    [1]   Social History  Socioeconomic History    Marital status:    Tobacco Use    Smoking status: Never    Smokeless tobacco: Never   Substance and Sexual Activity    Alcohol use: Yes     Comment: rarely    Drug use: No    Sexual activity: Yes     Partners: Male     Birth control/protection: Post-menopausal     Social Drivers of Health     Financial Resource Strain: Low Risk  (10/11/2024)    Overall Financial Resource Strain (CARDIA)     Difficulty of Paying Living Expenses: Not very hard   Food Insecurity: No Food Insecurity (10/11/2024)    Hunger Vital Sign     Worried About Running Out of Food in the Last Year: Never true     Ran Out of Food in the Last Year: Never true   Transportation Needs: No Transportation Needs (11/10/2022)    Received from [facility] - Transportation     Lack of Transportation (Medical): No     Lack of Transportation (Non-Medical): No   Physical Activity: Inactive (10/11/2024)    Exercise Vital Sign     Days of Exercise per Week: 0 days     Minutes of Exercise per Session: 0 min   Stress: Stress Concern Present (10/11/2024)    [employer] - Occupational Stress Questionnaire     Feeling of Stress : To some extent   Housing Stability: Unknown (10/11/2024)    Housing Stability Vital Sign     Unable to Pay  for Housing in the Last Year: No   [2]   Current Outpatient Medications:     citalopram (CELEXA) 20 MG tablet, Take 1 tablet by mouth once daily., Disp: , Rfl:     diltiaZEM (CARDIZEM CD) 300 MG 24 hr capsule, Take 300 mg by mouth once daily., Disp: , Rfl:     ergocalciferol (ERGOCALCIFEROL) 50,000 unit Cap, Take 50,000 Units by mouth every 7 days., Disp: , Rfl:     omega-3 fatty acids/fish oil (FISH OIL-OMEGA-3 FATTY ACIDS) 300-1,000 mg capsule, Take 1 capsule by mouth once daily., Disp: , Rfl:     omeprazole (PRILOSEC) 20 MG capsule, Take 2 capsules (40 mg total) by mouth once daily., Disp: 30 capsule, Rfl: 0    ZENATANE 40 mg capsule, TAKE ONE CAPSULE BY MOUTH DAILY WITH FOOD, Disp: , Rfl:     estradioL (ESTRACE) 1 MG tablet, Take 1 tablet (1 mg total) by mouth once daily., Disp: 90 tablet, Rfl: 3    progesterone (PROMETRIUM) 100 MG capsule, Take 1 capsule (100 mg total) by mouth once daily., Disp: 30 capsule, Rfl: 11

## 2025-04-13 RX ORDER — PROGESTERONE 100 MG/1
100 CAPSULE ORAL DAILY
Qty: 30 CAPSULE | Refills: 11 | Status: SHIPPED | OUTPATIENT
Start: 2025-04-13

## 2025-04-13 RX ORDER — ESTRADIOL 1 MG/1
1 TABLET ORAL DAILY
Qty: 90 TABLET | Refills: 3 | Status: SHIPPED | OUTPATIENT
Start: 2025-04-13

## 2025-04-14 ENCOUNTER — PATIENT MESSAGE (OUTPATIENT)
Dept: OBSTETRICS AND GYNECOLOGY | Facility: CLINIC | Age: 65
End: 2025-04-14
Payer: COMMERCIAL

## 2025-05-19 ENCOUNTER — TELEPHONE (OUTPATIENT)
Dept: OBSTETRICS AND GYNECOLOGY | Facility: CLINIC | Age: 65
End: 2025-05-19
Payer: COMMERCIAL

## 2025-05-19 DIAGNOSIS — Z12.31 ENCOUNTER FOR SCREENING MAMMOGRAM FOR BREAST CANCER: Primary | ICD-10-CM

## 2025-05-29 ENCOUNTER — RESULTS FOLLOW-UP (OUTPATIENT)
Dept: OBSTETRICS AND GYNECOLOGY | Facility: CLINIC | Age: 65
End: 2025-05-29

## 2025-06-03 ENCOUNTER — TELEPHONE (OUTPATIENT)
Dept: OBSTETRICS AND GYNECOLOGY | Facility: CLINIC | Age: 65
End: 2025-06-03
Payer: COMMERCIAL

## 2025-08-25 ENCOUNTER — OFFICE VISIT (OUTPATIENT)
Dept: OBSTETRICS AND GYNECOLOGY | Facility: CLINIC | Age: 65
End: 2025-08-25
Payer: COMMERCIAL

## 2025-08-25 VITALS — BODY MASS INDEX: 35.36 KG/M2 | DIASTOLIC BLOOD PRESSURE: 74 MMHG | SYSTOLIC BLOOD PRESSURE: 135 MMHG | WEIGHT: 206 LBS

## 2025-08-25 DIAGNOSIS — L68.0 HIRSUTISM: Primary | ICD-10-CM

## 2025-08-25 PROCEDURE — 99213 OFFICE O/P EST LOW 20 MIN: CPT | Mod: S$GLB,,, | Performed by: NURSE PRACTITIONER

## 2025-08-25 PROCEDURE — 99999 PR PBB SHADOW E&M-EST. PATIENT-LVL III: CPT | Mod: PBBFAC,,, | Performed by: NURSE PRACTITIONER

## 2025-08-25 PROCEDURE — 3075F SYST BP GE 130 - 139MM HG: CPT | Mod: CPTII,S$GLB,, | Performed by: NURSE PRACTITIONER

## 2025-08-25 PROCEDURE — 3008F BODY MASS INDEX DOCD: CPT | Mod: CPTII,S$GLB,, | Performed by: NURSE PRACTITIONER

## 2025-08-25 PROCEDURE — 1159F MED LIST DOCD IN RCRD: CPT | Mod: CPTII,S$GLB,, | Performed by: NURSE PRACTITIONER

## 2025-08-25 PROCEDURE — 1160F RVW MEDS BY RX/DR IN RCRD: CPT | Mod: CPTII,S$GLB,, | Performed by: NURSE PRACTITIONER

## 2025-08-25 PROCEDURE — 3078F DIAST BP <80 MM HG: CPT | Mod: CPTII,S$GLB,, | Performed by: NURSE PRACTITIONER

## 2025-08-25 RX ORDER — SPIRONOLACTONE 50 MG/1
50 TABLET, FILM COATED ORAL DAILY
Qty: 30 TABLET | Refills: 11 | Status: SHIPPED | OUTPATIENT
Start: 2025-08-25 | End: 2026-08-25

## (undated) DEVICE — CLOSURE SKIN STERI STRIP 1/2X4

## (undated) DEVICE — Device

## (undated) DEVICE — SUT VICRYL+ 27 UR-6 VIOL

## (undated) DEVICE — SYR 10CC LUER LOCK

## (undated) DEVICE — SUT 4/0 18IN COATED VICRYL

## (undated) DEVICE — COVER OVERHEAD SURG LT BLUE

## (undated) DEVICE — DRESSING ADH ISLAND 2.5 X 3

## (undated) DEVICE — GLOVE BIOGEL 7.5

## (undated) DEVICE — IRRIGATOR ENDOSCOPY DISP.

## (undated) DEVICE — GLOVE SIGNATURE ESSNTL LTX 7.5

## (undated) DEVICE — APPLICATOR CHLORAPREP ORN 26ML

## (undated) DEVICE — TUBING INSUFFLATION 10

## (undated) DEVICE — BAG TISS RETRV MONARCH 10MM

## (undated) DEVICE — CANISTER SUCTION 2 LTR

## (undated) DEVICE — SUT VICRYL 3-0 27 SH

## (undated) DEVICE — GLOVE SURG BIOGEL LATEX SZ 7.5

## (undated) DEVICE — GOWN NONREINF SET-IN SLV XL

## (undated) DEVICE — SUT VICRYL PLUS 4-0 P3 18IN

## (undated) DEVICE — NDL HYPO REG 25G X 1 1/2

## (undated) DEVICE — KIT ANTIFOG

## (undated) DEVICE — ELECTRODE REM PLYHSV RETURN 9

## (undated) DEVICE — TROCAR ENDOPATH XCEL 11MM 10CM

## (undated) DEVICE — TIP YANKAUERS BULB NO VENT

## (undated) DEVICE — TROCAR ENDOPATH XCEL 5X100MM

## (undated) DEVICE — TOWEL OR XRAY BLUE 17X26IN

## (undated) DEVICE — APPLIER CLIP ENDO LIGAMAX 5MM

## (undated) DEVICE — SOL IRR SOD CHL .9% POUR

## (undated) DEVICE — FOAM APP FILM BARRIER NO STING

## (undated) DEVICE — PACK ENDOSCOPY GENERAL

## (undated) DEVICE — BLADE SURG CARBON STEEL SZ11

## (undated) DEVICE — SHEARS HARMONIC 36CM HD 1000I

## (undated) DEVICE — SOL NS 1000CC

## (undated) DEVICE — SEE MEDLINE ITEM 152622

## (undated) DEVICE — BLANKET UPPER BODY 78.7X29.9IN

## (undated) DEVICE — CANISTER SUCTION RIGID 2000CC